# Patient Record
Sex: MALE | Race: WHITE | NOT HISPANIC OR LATINO | Employment: OTHER | URBAN - METROPOLITAN AREA
[De-identification: names, ages, dates, MRNs, and addresses within clinical notes are randomized per-mention and may not be internally consistent; named-entity substitution may affect disease eponyms.]

---

## 2021-01-01 ENCOUNTER — HOSPITAL ENCOUNTER (EMERGENCY)
Facility: CLINIC | Age: 86
Discharge: HOME | End: 2021-10-09
Admitting: PHYSICIAN ASSISTANT
Payer: COMMERCIAL

## 2021-01-01 VITALS
RESPIRATION RATE: 18 BRPM | SYSTOLIC BLOOD PRESSURE: 140 MMHG | DIASTOLIC BLOOD PRESSURE: 76 MMHG | HEART RATE: 56 BPM | OXYGEN SATURATION: 96 %

## 2021-01-01 DIAGNOSIS — R04.0 EPISTAXIS: ICD-10-CM

## 2021-01-01 PROCEDURE — 99282 EMERGENCY DEPT VISIT SF MDM: CPT

## 2021-01-01 ASSESSMENT — ENCOUNTER SYMPTOMS
DYSURIA: 0
EYE DISCHARGE: 0
CHILLS: 0
COUGH: 0
NAUSEA: 0
WEAKNESS: 0
ABDOMINAL PAIN: 0
HEADACHES: 0
FEVER: 0
CHEST TIGHTNESS: 0
WOUND: 0
HEMATURIA: 0
TROUBLE SWALLOWING: 0
FREQUENCY: 0
NECK PAIN: 0
VOMITING: 0
DIARRHEA: 0
BACK PAIN: 0
NUMBNESS: 0
SORE THROAT: 0
SHORTNESS OF BREATH: 0

## 2021-10-10 NOTE — ED TRIAGE NOTES
Pt's daughter reports nose bleed from 17:15-18:15 then it stopped and restarted at 19:15.  Left nares actively bleeding and clamp on.  Only blood thinner is baby aspirin.  Pt denies fall/traumatic injury.

## 2021-10-10 NOTE — DISCHARGE INSTRUCTIONS
Please use the Elvis-Synephrine in the affected nostrils if there is repeat bleeding and use the nasal clamp to hold pressure.  If you have persistent symptoms that are not controlled by this method return to the ED for repeat assessment.

## 2021-10-10 NOTE — ED PROVIDER NOTES
EMERGENCY DEPARTMENT ENCOUNTER      NAME: Nic Cleary  AGE: 89 year old male  YOB: 1930  MRN: [id number]  EVALUATION DATE & TIME: 10/9/2021  7:51 PM    PCP: No primary care provider on file.    ED PROVIDER: Wilbert Hudson PA-C      Chief Complaint   Patient presents with     Epistaxis         FINAL IMPRESSION:  1. Epistaxis          MEDICAL DECISION MAKING:    Pertinent Labs & Imaging studies reviewed. (See chart for details)  89 year old male presents to the Emergency Department for evaluation of epistaxis.    After obtaining history present illness, reviewing vitals and examining the patient we did initiate medical treatment with Clara-Synephrine to the left nostril and clamping.  After 15 to 20 minutes of observation I remove the clamp.  There was no single discrete lesion of bleeding therefore I did not use silver nitrate.  There is no blood in the posterior pharynx.  The Claar-Synephrine and clamp has stopped the bleeding.  At this point time patient will be discharged home and can follow-up as an outpatient to primary care.        ED COURSE    7:53 PM I met with the patient, obtained history, performed an initial exam, and discussed options and plan for diagnostics and treatment here in the ED. PPE worn including N95 mask, surgical gloves, eye protection.  8:24 PM I rechecked and updated the patient and family.    At the conclusion of the encounter I discussed the results of all of the tests and the disposition. The questions were answered. The patient or family acknowledged understanding and was agreeable with the care plan.     MEDICATIONS GIVEN IN THE EMERGENCY:  Medications   phenylephrine (CLARA-SYNEPHRINE) 0.5 % spray 1 drop (has no administration in time range)       NEW PRESCRIPTIONS STARTED AT TODAY'S ER VISIT  New Prescriptions    No medications on file            =================================================================    HPI    Patient information was obtained from: patient's  daughter    Use of Interpretor: N/A        Nic Cleary is a 89 year old male with a pertinent medical history of hypertension, hyperlipidemia, atrial fibrillation (2019) who presents to this ED by walk in with his daughter for evaluation of left nare epistaxis. Patient's daughter reports the patient began to bleed from his left nostril for 1 hour today from 17:15 - 18:15, but she was able to stop it by applying a pressure clamp to his nose. However, the bleeding returned spontaneously at 19:15. Patient used ocean nasal spray without any relief. Patient is on baby Asprin, but denies other blood thinner use. Patient's daughter notes the patient's epistaxis has become more common as she has forgotten to apply Vaseline in his nose in the mornings. Denies any falls or trauma.    No bleeding from the right nostril.      REVIEW OF SYSTEMS   Review of Systems   Constitutional: Negative for chills and fever.   HENT: Positive for nosebleeds (left nare only). Negative for congestion, sore throat and trouble swallowing.    Eyes: Negative for discharge and visual disturbance.   Respiratory: Negative for cough, chest tightness and shortness of breath.    Cardiovascular: Negative for chest pain and leg swelling.   Gastrointestinal: Negative for abdominal pain, diarrhea, nausea and vomiting.   Genitourinary: Negative for dysuria, frequency, hematuria and urgency.   Musculoskeletal: Negative for back pain, gait problem and neck pain.   Skin: Negative for rash and wound.   Neurological: Negative for weakness, numbness and headaches.   Psychiatric/Behavioral: Negative for behavioral problems and suicidal ideas.   All other systems reviewed and are negative.         PAST MEDICAL HISTORY:  No past medical history on file.    PAST SURGICAL HISTORY:  No past surgical history on file.      CURRENT MEDICATIONS:      Current Facility-Administered Medications:      phenylephrine (CLARA-SYNEPHRINE) 0.5 % spray 1 drop, 1 drop, Both Nostrils,  Jay, Wilbert Hudson PA-C  No current outpatient medications on file.      ALLERGIES:  No Known Allergies    FAMILY HISTORY:  No family history on file.    SOCIAL HISTORY:   Social History     Socioeconomic History     Marital status:      Spouse name: Not on file     Number of children: Not on file     Years of education: Not on file     Highest education level: Not on file   Occupational History     Not on file   Tobacco Use     Smoking status: Not on file   Substance and Sexual Activity     Alcohol use: Not on file     Drug use: Not on file     Sexual activity: Not on file   Other Topics Concern     Not on file   Social History Narrative     Not on file     Social Determinants of Health     Financial Resource Strain:      Difficulty of Paying Living Expenses:    Food Insecurity:      Worried About Running Out of Food in the Last Year:      Ran Out of Food in the Last Year:    Transportation Needs:      Lack of Transportation (Medical):      Lack of Transportation (Non-Medical):    Physical Activity:      Days of Exercise per Week:      Minutes of Exercise per Session:    Stress:      Feeling of Stress :    Social Connections:      Frequency of Communication with Friends and Family:      Frequency of Social Gatherings with Friends and Family:      Attends Sikhism Services:      Active Member of Clubs or Organizations:      Attends Club or Organization Meetings:      Marital Status:    Intimate Partner Violence:      Fear of Current or Ex-Partner:      Emotionally Abused:      Physically Abused:      Sexually Abused:        VITALS:  Patient Vitals for the past 24 hrs:   BP Pulse Resp SpO2   10/09/21 1948 (!) 176/98 56 18 96 %       PHYSICAL EXAM    Physical Exam  Vitals and nursing note reviewed.   Constitutional:       General: He is not in acute distress.     Appearance: Normal appearance. He is not ill-appearing or toxic-appearing.   HENT:      Head: Normocephalic and atraumatic.      Right Ear:  External ear normal.      Left Ear: External ear normal.      Nose: Nose normal.      Comments: Acute epistaxis coming from the left nare, minimal at this time.     Mouth/Throat:      Mouth: Mucous membranes are moist.      Pharynx: Oropharynx is clear. No oropharyngeal exudate or posterior oropharyngeal erythema.      Comments: No blood in the posterior pharynx  Eyes:      Extraocular Movements: Extraocular movements intact.      Conjunctiva/sclera: Conjunctivae normal.   Cardiovascular:      Rate and Rhythm: Regular rhythm.   Pulmonary:      Effort: Pulmonary effort is normal. No respiratory distress.   Musculoskeletal:         General: No swelling, tenderness, deformity or signs of injury. Normal range of motion.   Skin:     General: Skin is warm and dry.      Findings: No bruising, erythema or rash.   Neurological:      General: No focal deficit present.      Mental Status: He is alert and oriented to person, place, and time. Mental status is at baseline.      Sensory: No sensory deficit.      Motor: No weakness.      Gait: Gait normal.   Psychiatric:         Mood and Affect: Mood normal.         Behavior: Behavior normal.         Judgment: Judgment normal.          LAB:  All pertinent labs reviewed and interpreted.       RADIOLOGY:  Reviewed all pertinent imaging. Please see official radiology report.  No orders to display     Procedure:    Epistaxis treatment: Elvis-Synephrine 2 sprays were applied to the left nare nasal clamp applied.  This was observed for 15 to 20 minutes.  After this we we removed the clamp and bleeding has stopped.  No further management necessary.      I, Moy Fuentes, am serving as a scribe to document services personally performed by Wilbert Hudson PA-C based on my observation and the provider's statements to me. I, Wilbert Hudson PA-C attest that Moy Fuentes is acting in a scribe capacity, has observed my performance of the services and has documented them in  accordance with my direction.    Wilbert Hudson PA-C  Emergency Medicine  [facility]     Wilbert Hudson PA-C  10/09/21 2039

## 2022-01-01 ENCOUNTER — NURSING HOME VISIT (OUTPATIENT)
Dept: GERIATRICS | Facility: CLINIC | Age: 87
End: 2022-01-01
Payer: MEDICARE

## 2022-01-01 ENCOUNTER — APPOINTMENT (OUTPATIENT)
Dept: RADIOLOGY | Facility: CLINIC | Age: 87
DRG: 535 | End: 2022-01-01
Attending: STUDENT IN AN ORGANIZED HEALTH CARE EDUCATION/TRAINING PROGRAM
Payer: COMMERCIAL

## 2022-01-01 ENCOUNTER — APPOINTMENT (OUTPATIENT)
Dept: CT IMAGING | Facility: CLINIC | Age: 87
DRG: 535 | End: 2022-01-01
Attending: STUDENT IN AN ORGANIZED HEALTH CARE EDUCATION/TRAINING PROGRAM
Payer: COMMERCIAL

## 2022-01-01 ENCOUNTER — LAB REQUISITION (OUTPATIENT)
Dept: LAB | Facility: CLINIC | Age: 87
End: 2022-01-01
Payer: COMMERCIAL

## 2022-01-01 ENCOUNTER — HOSPITAL ENCOUNTER (INPATIENT)
Facility: CLINIC | Age: 87
LOS: 4 days | Discharge: HOSPICE | DRG: 535 | End: 2022-07-18
Attending: STUDENT IN AN ORGANIZED HEALTH CARE EDUCATION/TRAINING PROGRAM | Admitting: INTERNAL MEDICINE
Payer: COMMERCIAL

## 2022-01-01 ENCOUNTER — PATIENT OUTREACH (OUTPATIENT)
Dept: CARE COORDINATION | Facility: CLINIC | Age: 87
End: 2022-01-01

## 2022-01-01 ENCOUNTER — MEDICAL CORRESPONDENCE (OUTPATIENT)
Dept: HEALTH INFORMATION MANAGEMENT | Facility: CLINIC | Age: 87
End: 2022-01-01

## 2022-01-01 VITALS
HEART RATE: 83 BPM | DIASTOLIC BLOOD PRESSURE: 77 MMHG | TEMPERATURE: 96.8 F | WEIGHT: 167 LBS | BODY MASS INDEX: 23.91 KG/M2 | SYSTOLIC BLOOD PRESSURE: 157 MMHG | RESPIRATION RATE: 18 BRPM | OXYGEN SATURATION: 95 % | HEIGHT: 70 IN

## 2022-01-01 VITALS
DIASTOLIC BLOOD PRESSURE: 77 MMHG | OXYGEN SATURATION: 90 % | RESPIRATION RATE: 16 BRPM | HEART RATE: 72 BPM | TEMPERATURE: 97.2 F | BODY MASS INDEX: 24.02 KG/M2 | WEIGHT: 167.8 LBS | SYSTOLIC BLOOD PRESSURE: 163 MMHG | HEIGHT: 70 IN

## 2022-01-01 VITALS
HEIGHT: 70 IN | SYSTOLIC BLOOD PRESSURE: 110 MMHG | WEIGHT: 167 LBS | HEART RATE: 84 BPM | DIASTOLIC BLOOD PRESSURE: 66 MMHG | BODY MASS INDEX: 23.91 KG/M2 | TEMPERATURE: 97 F | RESPIRATION RATE: 24 BRPM | OXYGEN SATURATION: 93 %

## 2022-01-01 VITALS
WEIGHT: 185 LBS | OXYGEN SATURATION: 93 % | TEMPERATURE: 97.6 F | BODY MASS INDEX: 26.54 KG/M2 | HEART RATE: 83 BPM | SYSTOLIC BLOOD PRESSURE: 157 MMHG | RESPIRATION RATE: 18 BRPM | DIASTOLIC BLOOD PRESSURE: 77 MMHG

## 2022-01-01 DIAGNOSIS — I48.0 PAROXYSMAL ATRIAL FIBRILLATION (H): ICD-10-CM

## 2022-01-01 DIAGNOSIS — F02.80 LATE ONSET ALZHEIMER'S DEMENTIA WITHOUT BEHAVIORAL DISTURBANCE (H): ICD-10-CM

## 2022-01-01 DIAGNOSIS — Z51.5 HOSPICE CARE PATIENT: ICD-10-CM

## 2022-01-01 DIAGNOSIS — G30.1 LATE ONSET ALZHEIMER'S DEMENTIA WITHOUT BEHAVIORAL DISTURBANCE (H): Primary | ICD-10-CM

## 2022-01-01 DIAGNOSIS — Z51.5 END OF LIFE CARE: ICD-10-CM
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
## 2022-01-01 DIAGNOSIS — G30.1 LATE ONSET ALZHEIMER'S DEMENTIA WITHOUT BEHAVIORAL DISTURBANCE (H): ICD-10-CM

## 2022-01-01 DIAGNOSIS — F02.80 LATE ONSET ALZHEIMER'S DEMENTIA WITHOUT BEHAVIORAL DISTURBANCE (H): Primary | ICD-10-CM

## 2022-01-01 DIAGNOSIS — S32.9XXA: Primary | ICD-10-CM

## 2022-01-01 DIAGNOSIS — M79.662 PAIN OF LEFT LOWER LEG: ICD-10-CM

## 2022-01-01 DIAGNOSIS — U07.1 COVID-19: ICD-10-CM

## 2022-01-01 DIAGNOSIS — S32.9XXA: ICD-10-CM

## 2022-01-01 DIAGNOSIS — K59.09 OTHER CONSTIPATION: ICD-10-CM

## 2022-01-01 DIAGNOSIS — M79.622 PAIN OF LEFT UPPER ARM: ICD-10-CM

## 2022-01-01 DIAGNOSIS — I25.5 ISCHEMIC CARDIOMYOPATHY: ICD-10-CM

## 2022-01-01 DIAGNOSIS — S06.5X0D SUBDURAL HEMATOMA WITHOUT COMA, WITHOUT LOSS OF CONSCIOUSNESS, SUBSEQUENT ENCOUNTER: ICD-10-CM

## 2022-01-01 DIAGNOSIS — I73.9 PERIPHERAL VASCULAR DISEASE (H): Primary | ICD-10-CM

## 2022-01-01 DIAGNOSIS — S06.5X1D: ICD-10-CM

## 2022-01-01 DIAGNOSIS — S06.5X9S: ICD-10-CM

## 2022-01-01 DIAGNOSIS — F03.90 DEMENTIA WITHOUT BEHAVIORAL DISTURBANCE, UNSPECIFIED DEMENTIA TYPE: ICD-10-CM

## 2022-01-01 DIAGNOSIS — Z71.89 OTHER SPECIFIED COUNSELING: ICD-10-CM

## 2022-01-01 LAB
ALBUMIN SERPL-MCNC: 3.6 G/DL (ref 3.5–5)
ALBUMIN SERPL-MCNC: 3.9 G/DL (ref 3.5–5)
ALBUMIN UR-MCNC: 10 MG/DL
ALP SERPL-CCNC: 43 U/L (ref 45–120)
ALP SERPL-CCNC: 57 U/L (ref 45–120)
ALT SERPL W P-5'-P-CCNC: 10 U/L (ref 0–45)
ALT SERPL W P-5'-P-CCNC: <9 U/L (ref 0–45)
ANION GAP SERPL CALCULATED.3IONS-SCNC: 6 MMOL/L (ref 5–18)
ANION GAP SERPL CALCULATED.3IONS-SCNC: 8 MMOL/L (ref 5–18)
APPEARANCE UR: CLEAR
AST SERPL W P-5'-P-CCNC: 16 U/L (ref 0–40)
AST SERPL W P-5'-P-CCNC: 16 U/L (ref 0–40)
ATRIAL RATE - MUSE: 416 BPM
BASOPHILS # BLD AUTO: 0 10E3/UL (ref 0–0.2)
BASOPHILS # BLD AUTO: 0 10E3/UL (ref 0–0.2)
BASOPHILS NFR BLD AUTO: 0 %
BASOPHILS NFR BLD AUTO: 1 %
BILIRUB SERPL-MCNC: 0.8 MG/DL (ref 0–1)
BILIRUB SERPL-MCNC: 1.4 MG/DL (ref 0–1)
BILIRUB UR QL STRIP: NEGATIVE
BUN SERPL-MCNC: 17 MG/DL (ref 8–28)
BUN SERPL-MCNC: 17 MG/DL (ref 8–28)
CALCIUM SERPL-MCNC: 8.9 MG/DL (ref 8.5–10.5)
CALCIUM SERPL-MCNC: 9.3 MG/DL (ref 8.5–10.5)
CHLORIDE BLD-SCNC: 103 MMOL/L (ref 98–107)
CHLORIDE BLD-SCNC: 104 MMOL/L (ref 98–107)
CK SERPL-CCNC: 117 U/L (ref 30–190)
CO2 SERPL-SCNC: 26 MMOL/L (ref 22–31)
CO2 SERPL-SCNC: 28 MMOL/L (ref 22–31)
COLOR UR AUTO: YELLOW
CREAT SERPL-MCNC: 0.81 MG/DL (ref 0.7–1.3)
CREAT SERPL-MCNC: 0.89 MG/DL (ref 0.7–1.3)
DIASTOLIC BLOOD PRESSURE - MUSE: 93 MMHG
EOSINOPHIL # BLD AUTO: 0.2 10E3/UL (ref 0–0.7)
EOSINOPHIL # BLD AUTO: 0.4 10E3/UL (ref 0–0.7)
EOSINOPHIL NFR BLD AUTO: 2 %
EOSINOPHIL NFR BLD AUTO: 4 %
ERYTHROCYTE [DISTWIDTH] IN BLOOD BY AUTOMATED COUNT: 13.1 % (ref 10–15)
ERYTHROCYTE [DISTWIDTH] IN BLOOD BY AUTOMATED COUNT: 13.2 % (ref 10–15)
GFR SERPL CREATININE-BSD FRML MDRD: 81 ML/MIN/1.73M2
GFR SERPL CREATININE-BSD FRML MDRD: 83 ML/MIN/1.73M2
GLUCOSE BLD-MCNC: 114 MG/DL (ref 70–125)
GLUCOSE BLD-MCNC: 116 MG/DL (ref 70–125)
GLUCOSE UR STRIP-MCNC: NEGATIVE MG/DL
HCT VFR BLD AUTO: 31.9 % (ref 40–53)
HCT VFR BLD AUTO: 35.5 % (ref 40–53)
HGB BLD-MCNC: 10.6 G/DL (ref 13.3–17.7)
HGB BLD-MCNC: 11.7 G/DL (ref 13.3–17.7)
HGB UR QL STRIP: ABNORMAL
IMM GRANULOCYTES # BLD: 0 10E3/UL
IMM GRANULOCYTES # BLD: 0.1 10E3/UL
IMM GRANULOCYTES NFR BLD: 0 %
IMM GRANULOCYTES NFR BLD: 1 %
INTERPRETATION ECG - MUSE: NORMAL
KETONES UR STRIP-MCNC: NEGATIVE MG/DL
LEUKOCYTE ESTERASE UR QL STRIP: NEGATIVE
LYMPHOCYTES # BLD AUTO: 1.4 10E3/UL (ref 0.8–5.3)
LYMPHOCYTES # BLD AUTO: 1.5 10E3/UL (ref 0.8–5.3)
LYMPHOCYTES NFR BLD AUTO: 12 %
LYMPHOCYTES NFR BLD AUTO: 18 %
MAGNESIUM SERPL-MCNC: 1.9 MG/DL (ref 1.8–2.6)
MCH RBC QN AUTO: 30.2 PG (ref 26.5–33)
MCH RBC QN AUTO: 30.3 PG (ref 26.5–33)
MCHC RBC AUTO-ENTMCNC: 33 G/DL (ref 31.5–36.5)
MCHC RBC AUTO-ENTMCNC: 33.2 G/DL (ref 31.5–36.5)
MCV RBC AUTO: 91 FL (ref 78–100)
MCV RBC AUTO: 92 FL (ref 78–100)
MONOCYTES # BLD AUTO: 0.7 10E3/UL (ref 0–1.3)
MONOCYTES # BLD AUTO: 0.8 10E3/UL (ref 0–1.3)
MONOCYTES NFR BLD AUTO: 7 %
MONOCYTES NFR BLD AUTO: 9 %
MUCOUS THREADS #/AREA URNS LPF: PRESENT /LPF
NEUTROPHILS # BLD AUTO: 5.5 10E3/UL (ref 1.6–8.3)
NEUTROPHILS # BLD AUTO: 8.7 10E3/UL (ref 1.6–8.3)
NEUTROPHILS NFR BLD AUTO: 68 %
NEUTROPHILS NFR BLD AUTO: 78 %
NITRATE UR QL: NEGATIVE
NRBC # BLD AUTO: 0 10E3/UL
NRBC # BLD AUTO: 0 10E3/UL
NRBC BLD AUTO-RTO: 0 /100
NRBC BLD AUTO-RTO: 0 /100
P AXIS - MUSE: NORMAL DEGREES
PH UR STRIP: 5.5 [PH] (ref 5–7)
PLATELET # BLD AUTO: 141 10E3/UL (ref 150–450)
PLATELET # BLD AUTO: 154 10E3/UL (ref 150–450)
POTASSIUM BLD-SCNC: 4 MMOL/L (ref 3.5–5)
POTASSIUM BLD-SCNC: 4.1 MMOL/L (ref 3.5–5)
PR INTERVAL - MUSE: NORMAL MS
PROT SERPL-MCNC: 6.2 G/DL (ref 6–8)
PROT SERPL-MCNC: 6.6 G/DL (ref 6–8)
QRS DURATION - MUSE: 98 MS
QT - MUSE: 384 MS
QTC - MUSE: 456 MS
R AXIS - MUSE: -39 DEGREES
RBC # BLD AUTO: 3.5 10E6/UL (ref 4.4–5.9)
RBC # BLD AUTO: 3.88 10E6/UL (ref 4.4–5.9)
RBC URINE: 23 /HPF
SARS-COV-2 RNA RESP QL NAA+PROBE: NEGATIVE
SARS-COV-2 RNA RESP QL NAA+PROBE: NEGATIVE
SODIUM SERPL-SCNC: 137 MMOL/L (ref 136–145)
SODIUM SERPL-SCNC: 138 MMOL/L (ref 136–145)
SP GR UR STRIP: 1.02 (ref 1–1.03)
SQUAMOUS EPITHELIAL: <1 /HPF
SYSTOLIC BLOOD PRESSURE - MUSE: 179 MMHG
T AXIS - MUSE: 74 DEGREES
TROPONIN I SERPL-MCNC: 0.02 NG/ML (ref 0–0.29)
UROBILINOGEN UR STRIP-MCNC: <2 MG/DL
VENTRICULAR RATE- MUSE: 85 BPM
WBC # BLD AUTO: 11.1 10E3/UL (ref 4–11)
WBC # BLD AUTO: 8.1 10E3/UL (ref 4–11)
WBC URINE: 4 /HPF

## 2022-01-01 PROCEDURE — 99305 1ST NF CARE MODERATE MDM 35: CPT | Mod: GV | Performed by: FAMILY MEDICINE

## 2022-01-01 PROCEDURE — 250N000013 HC RX MED GY IP 250 OP 250 PS 637: Performed by: INTERNAL MEDICINE

## 2022-01-01 PROCEDURE — 85025 COMPLETE CBC W/AUTO DIFF WBC: CPT | Performed by: STUDENT IN AN ORGANIZED HEALTH CARE EDUCATION/TRAINING PROGRAM

## 2022-01-01 PROCEDURE — 73610 X-RAY EXAM OF ANKLE: CPT | Mod: RT

## 2022-01-01 PROCEDURE — 70450 CT HEAD/BRAIN W/O DYE: CPT

## 2022-01-01 PROCEDURE — XW023W7 INTRODUCTION OF COVID-19 VACCINE BOOSTER INTO MUSCLE, PERCUTANEOUS APPROACH, NEW TECHNOLOGY GROUP 7: ICD-10-PCS | Performed by: FAMILY MEDICINE

## 2022-01-01 PROCEDURE — 250N000013 HC RX MED GY IP 250 OP 250 PS 637: Performed by: NURSE PRACTITIONER

## 2022-01-01 PROCEDURE — C9803 HOPD COVID-19 SPEC COLLECT: HCPCS

## 2022-01-01 PROCEDURE — 73060 X-RAY EXAM OF HUMERUS: CPT | Mod: LT

## 2022-01-01 PROCEDURE — 36415 COLL VENOUS BLD VENIPUNCTURE: CPT | Performed by: STUDENT IN AN ORGANIZED HEALTH CARE EDUCATION/TRAINING PROGRAM

## 2022-01-01 PROCEDURE — 74176 CT ABD & PELVIS W/O CONTRAST: CPT

## 2022-01-01 PROCEDURE — G0378 HOSPITAL OBSERVATION PER HR: HCPCS

## 2022-01-01 PROCEDURE — 36415 COLL VENOUS BLD VENIPUNCTURE: CPT | Performed by: INTERNAL MEDICINE

## 2022-01-01 PROCEDURE — 72170 X-RAY EXAM OF PELVIS: CPT

## 2022-01-01 PROCEDURE — 80053 COMPREHEN METABOLIC PANEL: CPT | Performed by: INTERNAL MEDICINE

## 2022-01-01 PROCEDURE — 99309 SBSQ NF CARE MODERATE MDM 30: CPT | Mod: GV | Performed by: NURSE PRACTITIONER

## 2022-01-01 PROCEDURE — 99232 SBSQ HOSP IP/OBS MODERATE 35: CPT | Performed by: INTERNAL MEDICINE

## 2022-01-01 PROCEDURE — U0003 INFECTIOUS AGENT DETECTION BY NUCLEIC ACID (DNA OR RNA); SEVERE ACUTE RESPIRATORY SYNDROME CORONAVIRUS 2 (SARS-COV-2) (CORONAVIRUS DISEASE [COVID-19]), AMPLIFIED PROBE TECHNIQUE, MAKING USE OF HIGH THROUGHPUT TECHNOLOGIES AS DESCRIBED BY CMS-2020-01-R: HCPCS | Performed by: STUDENT IN AN ORGANIZED HEALTH CARE EDUCATION/TRAINING PROGRAM

## 2022-01-01 PROCEDURE — 99310 SBSQ NF CARE HIGH MDM 45: CPT | Mod: GV | Performed by: NURSE PRACTITIONER

## 2022-01-01 PROCEDURE — 73552 X-RAY EXAM OF FEMUR 2/>: CPT | Mod: LT

## 2022-01-01 PROCEDURE — U0003 INFECTIOUS AGENT DETECTION BY NUCLEIC ACID (DNA OR RNA); SEVERE ACUTE RESPIRATORY SYNDROME CORONAVIRUS 2 (SARS-COV-2) (CORONAVIRUS DISEASE [COVID-19]), AMPLIFIED PROBE TECHNIQUE, MAKING USE OF HIGH THROUGHPUT TECHNOLOGIES AS DESCRIBED BY CMS-2020-01-R: HCPCS | Mod: ORL | Performed by: FAMILY MEDICINE

## 2022-01-01 PROCEDURE — 250N000011 HC RX IP 250 OP 636: Performed by: FAMILY MEDICINE

## 2022-01-01 PROCEDURE — 120N000001 HC R&B MED SURG/OB

## 2022-01-01 PROCEDURE — 84484 ASSAY OF TROPONIN QUANT: CPT | Performed by: STUDENT IN AN ORGANIZED HEALTH CARE EDUCATION/TRAINING PROGRAM

## 2022-01-01 PROCEDURE — 72125 CT NECK SPINE W/O DYE: CPT

## 2022-01-01 PROCEDURE — 99223 1ST HOSP IP/OBS HIGH 75: CPT | Performed by: NURSE PRACTITIONER

## 2022-01-01 PROCEDURE — 81001 URINALYSIS AUTO W/SCOPE: CPT | Performed by: INTERNAL MEDICINE

## 2022-01-01 PROCEDURE — 93005 ELECTROCARDIOGRAM TRACING: CPT | Performed by: EMERGENCY MEDICINE

## 2022-01-01 PROCEDURE — 0054A HC ADMIN COVID VAC PFIZER TRS-SUCR, BOOSTER: CPT | Performed by: FAMILY MEDICINE

## 2022-01-01 PROCEDURE — 250N000011 HC RX IP 250 OP 636: Performed by: INTERNAL MEDICINE

## 2022-01-01 PROCEDURE — 99220 PR INITIAL OBSERVATION CARE,LEVEL III: CPT | Performed by: INTERNAL MEDICINE

## 2022-01-01 PROCEDURE — 250N000013 HC RX MED GY IP 250 OP 250 PS 637: Performed by: STUDENT IN AN ORGANIZED HEALTH CARE EDUCATION/TRAINING PROGRAM

## 2022-01-01 PROCEDURE — 83735 ASSAY OF MAGNESIUM: CPT | Performed by: STUDENT IN AN ORGANIZED HEALTH CARE EDUCATION/TRAINING PROGRAM

## 2022-01-01 PROCEDURE — 99285 EMERGENCY DEPT VISIT HI MDM: CPT | Mod: 25

## 2022-01-01 PROCEDURE — 250N000013 HC RX MED GY IP 250 OP 250 PS 637: Performed by: FAMILY MEDICINE

## 2022-01-01 PROCEDURE — 85025 COMPLETE CBC W/AUTO DIFF WBC: CPT | Performed by: INTERNAL MEDICINE

## 2022-01-01 PROCEDURE — 99239 HOSP IP/OBS DSCHRG MGMT >30: CPT | Performed by: FAMILY MEDICINE

## 2022-01-01 PROCEDURE — 82550 ASSAY OF CK (CPK): CPT | Performed by: STUDENT IN AN ORGANIZED HEALTH CARE EDUCATION/TRAINING PROGRAM

## 2022-01-01 PROCEDURE — 91305 HC RX IP 250 OP 636: CPT | Performed by: FAMILY MEDICINE

## 2022-01-01 PROCEDURE — 80053 COMPREHEN METABOLIC PANEL: CPT | Performed by: STUDENT IN AN ORGANIZED HEALTH CARE EDUCATION/TRAINING PROGRAM

## 2022-01-01 RX ORDER — QUETIAPINE FUMARATE 25 MG/1
25-50 TABLET, FILM COATED ORAL AT BEDTIME
Status: ON HOLD | COMMUNITY
Start: 2022-01-01 | End: 2022-01-01

## 2022-01-01 RX ORDER — LISINOPRIL 10 MG/1
10 TABLET ORAL AT BEDTIME
Status: DISCONTINUED | OUTPATIENT
Start: 2022-01-01 | End: 2022-01-01 | Stop reason: HOSPADM

## 2022-01-01 RX ORDER — NALOXONE HYDROCHLORIDE 0.4 MG/ML
0.2 INJECTION, SOLUTION INTRAMUSCULAR; INTRAVENOUS; SUBCUTANEOUS
Status: DISCONTINUED | OUTPATIENT
Start: 2022-01-01 | End: 2022-01-01 | Stop reason: DRUGHIGH

## 2022-01-01 RX ORDER — ASPIRIN 81 MG/1
81 TABLET ORAL DAILY
COMMUNITY
End: 2022-01-01

## 2022-01-01 RX ORDER — ONDANSETRON 2 MG/ML
4 INJECTION INTRAMUSCULAR; INTRAVENOUS EVERY 6 HOURS PRN
Status: DISCONTINUED | OUTPATIENT
Start: 2022-01-01 | End: 2022-01-01 | Stop reason: HOSPADM

## 2022-01-01 RX ORDER — OXYCODONE HYDROCHLORIDE 5 MG/1
2.5 TABLET ORAL EVERY 12 HOURS
Qty: 6 TABLET | Refills: 0 | Status: SHIPPED | DISCHARGE
Start: 2022-01-01 | End: 2022-01-01

## 2022-01-01 RX ORDER — NALOXONE HYDROCHLORIDE 0.4 MG/ML
0.4 INJECTION, SOLUTION INTRAMUSCULAR; INTRAVENOUS; SUBCUTANEOUS
Status: DISCONTINUED | OUTPATIENT
Start: 2022-01-01 | End: 2022-01-01 | Stop reason: DRUGHIGH

## 2022-01-01 RX ORDER — CARBOXYMETHYLCELLULOSE SODIUM 5 MG/ML
1-2 SOLUTION/ DROPS OPHTHALMIC
Status: DISCONTINUED | OUTPATIENT
Start: 2022-01-01 | End: 2022-01-01 | Stop reason: HOSPADM

## 2022-01-01 RX ORDER — ACETAMINOPHEN 325 MG/1
650 TABLET ORAL EVERY 6 HOURS PRN
Status: DISCONTINUED | OUTPATIENT
Start: 2022-01-01 | End: 2022-01-01 | Stop reason: HOSPADM

## 2022-01-01 RX ORDER — DIPHENHYDRAMINE HYDROCHLORIDE 50 MG/ML
50 INJECTION INTRAMUSCULAR; INTRAVENOUS
Status: DISCONTINUED | OUTPATIENT
Start: 2022-01-01 | End: 2022-01-01 | Stop reason: HOSPADM

## 2022-01-01 RX ORDER — PROCHLORPERAZINE MALEATE 10 MG
10 TABLET ORAL EVERY 6 HOURS PRN
COMMUNITY

## 2022-01-01 RX ORDER — LATANOPROST 50 UG/ML
1 SOLUTION/ DROPS OPHTHALMIC EVERY MORNING
Status: DISCONTINUED | OUTPATIENT
Start: 2022-01-01 | End: 2022-01-01 | Stop reason: HOSPADM

## 2022-01-01 RX ORDER — QUETIAPINE FUMARATE 25 MG/1
25 TABLET, FILM COATED ORAL EVERY 6 HOURS PRN
Status: DISCONTINUED | OUTPATIENT
Start: 2022-01-01 | End: 2022-01-01 | Stop reason: HOSPADM

## 2022-01-01 RX ORDER — ONDANSETRON 4 MG/1
4 TABLET, ORALLY DISINTEGRATING ORAL EVERY 6 HOURS PRN
Status: DISCONTINUED | OUTPATIENT
Start: 2022-01-01 | End: 2022-01-01 | Stop reason: HOSPADM

## 2022-01-01 RX ORDER — MORPHINE SULFATE 30 MG/1
2.5 TABLET ORAL
COMMUNITY

## 2022-01-01 RX ORDER — BISACODYL 10 MG
10 SUPPOSITORY, RECTAL RECTAL
Status: DISCONTINUED | OUTPATIENT
Start: 2022-01-01 | End: 2022-01-01 | Stop reason: HOSPADM

## 2022-01-01 RX ORDER — AMOXICILLIN 250 MG
1 CAPSULE ORAL AT BEDTIME
Status: DISCONTINUED | OUTPATIENT
Start: 2022-01-01 | End: 2022-01-01

## 2022-01-01 RX ORDER — FUROSEMIDE 20 MG/1
10 TABLET ORAL EVERY OTHER DAY
Status: DISCONTINUED | OUTPATIENT
Start: 2022-01-01 | End: 2022-01-01

## 2022-01-01 RX ORDER — ASPIRIN 81 MG/1
81 TABLET ORAL DAILY
Status: DISCONTINUED | OUTPATIENT
Start: 2022-01-01 | End: 2022-01-01 | Stop reason: HOSPADM

## 2022-01-01 RX ORDER — BISACODYL 10 MG
10 SUPPOSITORY, RECTAL RECTAL DAILY PRN
COMMUNITY

## 2022-01-01 RX ORDER — NALOXONE HYDROCHLORIDE 0.4 MG/ML
0.2 INJECTION, SOLUTION INTRAMUSCULAR; INTRAVENOUS; SUBCUTANEOUS
Status: DISCONTINUED | OUTPATIENT
Start: 2022-01-01 | End: 2022-01-01 | Stop reason: HOSPADM

## 2022-01-01 RX ORDER — SERTRALINE HYDROCHLORIDE 25 MG/1
37.5 TABLET, FILM COATED ORAL EVERY MORNING
COMMUNITY
Start: 2022-01-01 | End: 2022-01-01

## 2022-01-01 RX ORDER — QUETIAPINE FUMARATE 25 MG/1
25 TABLET, FILM COATED ORAL EVERY 6 HOURS PRN
Qty: 10 TABLET | Refills: 0 | DISCHARGE
Start: 2022-01-01 | End: 2022-01-01

## 2022-01-01 RX ORDER — LATANOPROST 50 UG/ML
1 SOLUTION/ DROPS OPHTHALMIC EVERY MORNING
COMMUNITY
Start: 2022-01-01 | End: 2022-01-01

## 2022-01-01 RX ORDER — ACETAMINOPHEN 500 MG
1000 TABLET ORAL 2 TIMES DAILY
Status: DISCONTINUED | OUTPATIENT
Start: 2022-01-01 | End: 2022-01-01 | Stop reason: HOSPADM

## 2022-01-01 RX ORDER — ACETAMINOPHEN 325 MG/1
650 TABLET ORAL EVERY 6 HOURS PRN
DISCHARGE
Start: 2022-01-01 | End: 2022-01-01

## 2022-01-01 RX ORDER — HYDROCODONE BITARTRATE AND ACETAMINOPHEN 5; 325 MG/1; MG/1
1 TABLET ORAL EVERY 6 HOURS PRN
Status: DISCONTINUED | OUTPATIENT
Start: 2022-01-01 | End: 2022-01-01 | Stop reason: HOSPADM

## 2022-01-01 RX ORDER — HYDROCODONE BITARTRATE AND ACETAMINOPHEN 5; 325 MG/1; MG/1
1 TABLET ORAL EVERY 6 HOURS PRN
Qty: 15 TABLET | Refills: 0 | Status: SHIPPED | DISCHARGE
Start: 2022-01-01 | End: 2022-01-01

## 2022-01-01 RX ORDER — LEVOTHYROXINE SODIUM 75 UG/1
75 TABLET ORAL
COMMUNITY
Start: 2022-01-01 | End: 2022-01-01

## 2022-01-01 RX ORDER — HALOPERIDOL 2 MG/ML
1 SOLUTION ORAL
Status: DISCONTINUED | OUTPATIENT
Start: 2022-01-01 | End: 2022-01-01

## 2022-01-01 RX ORDER — QUETIAPINE FUMARATE 50 MG/1
50 TABLET, FILM COATED ORAL AT BEDTIME
Status: DISCONTINUED | OUTPATIENT
Start: 2022-01-01 | End: 2022-01-01

## 2022-01-01 RX ORDER — LEVOTHYROXINE SODIUM 75 UG/1
75 TABLET ORAL
Status: DISCONTINUED | OUTPATIENT
Start: 2022-01-01 | End: 2022-01-01 | Stop reason: HOSPADM

## 2022-01-01 RX ORDER — AMOXICILLIN 250 MG
1 CAPSULE ORAL EVERY MORNING
Status: ON HOLD | COMMUNITY
End: 2022-01-01

## 2022-01-01 RX ORDER — AMOXICILLIN 250 MG
2 CAPSULE ORAL AT BEDTIME
Status: ON HOLD | COMMUNITY
End: 2022-01-01

## 2022-01-01 RX ORDER — HYDROMORPHONE HCL IN WATER/PF 6 MG/30 ML
0.2 PATIENT CONTROLLED ANALGESIA SYRINGE INTRAVENOUS EVERY 4 HOURS PRN
Status: DISCONTINUED | OUTPATIENT
Start: 2022-01-01 | End: 2022-01-01 | Stop reason: HOSPADM

## 2022-01-01 RX ORDER — AMOXICILLIN 250 MG
1 CAPSULE ORAL 2 TIMES DAILY
DISCHARGE
Start: 2022-01-01 | End: 2022-01-01

## 2022-01-01 RX ORDER — QUETIAPINE FUMARATE 25 MG/1
25-50 TABLET, FILM COATED ORAL AT BEDTIME
Status: DISCONTINUED | OUTPATIENT
Start: 2022-01-01 | End: 2022-01-01 | Stop reason: HOSPADM

## 2022-01-01 RX ORDER — NALOXONE HYDROCHLORIDE 0.4 MG/ML
0.1 INJECTION, SOLUTION INTRAMUSCULAR; INTRAVENOUS; SUBCUTANEOUS
Status: DISCONTINUED | OUTPATIENT
Start: 2022-01-01 | End: 2022-01-01 | Stop reason: HOSPADM

## 2022-01-01 RX ORDER — ACETAMINOPHEN 500 MG
1000 TABLET ORAL 2 TIMES DAILY
COMMUNITY
End: 2022-01-01

## 2022-01-01 RX ORDER — ACETAMINOPHEN 650 MG/1
650 SUPPOSITORY RECTAL EVERY 6 HOURS PRN
Status: DISCONTINUED | OUTPATIENT
Start: 2022-01-01 | End: 2022-01-01 | Stop reason: HOSPADM

## 2022-01-01 RX ORDER — FUROSEMIDE 20 MG
10 TABLET ORAL EVERY OTHER DAY
Status: ON HOLD | COMMUNITY
Start: 2022-01-01 | End: 2022-01-01

## 2022-01-01 RX ORDER — DIPHENHYDRAMINE HCL 50 MG
50 CAPSULE ORAL
Status: DISCONTINUED | OUTPATIENT
Start: 2022-01-01 | End: 2022-01-01 | Stop reason: HOSPADM

## 2022-01-01 RX ORDER — LISINOPRIL 10 MG/1
10 TABLET ORAL AT BEDTIME
COMMUNITY
Start: 2022-01-01 | End: 2022-01-01

## 2022-01-01 RX ORDER — HYOSCYAMINE SULFATE 0.125 MG
0.12 TABLET ORAL EVERY 4 HOURS PRN
COMMUNITY

## 2022-01-01 RX ORDER — OXYCODONE HYDROCHLORIDE 5 MG/1
2.5 TABLET ORAL EVERY 12 HOURS
Refills: 0 | Status: SHIPPED | DISCHARGE
Start: 2022-01-01 | End: 2022-01-01

## 2022-01-01 RX ORDER — QUETIAPINE FUMARATE 25 MG/1
50 TABLET, FILM COATED ORAL AT BEDTIME
Qty: 7 TABLET | Refills: 0 | DISCHARGE
Start: 2022-01-01 | End: 2022-01-01

## 2022-01-01 RX ORDER — ATROPINE SULFATE 10 MG/ML
2 SOLUTION/ DROPS OPHTHALMIC EVERY 4 HOURS PRN
Status: DISCONTINUED | OUTPATIENT
Start: 2022-01-01 | End: 2022-01-01 | Stop reason: HOSPADM

## 2022-01-01 RX ORDER — AMOXICILLIN 250 MG
1 CAPSULE ORAL 2 TIMES DAILY
Status: DISCONTINUED | OUTPATIENT
Start: 2022-01-01 | End: 2022-01-01 | Stop reason: HOSPADM

## 2022-01-01 RX ADMIN — GLYCERIN 1 SUPPOSITORY: 2 SUPPOSITORY RECTAL at 06:55

## 2022-01-01 RX ADMIN — QUETIAPINE FUMARATE 50 MG: 25 TABLET ORAL at 19:31

## 2022-01-01 RX ADMIN — OXYCODONE HYDROCHLORIDE 2.5 MG: 5 TABLET ORAL at 16:37

## 2022-01-01 RX ADMIN — SENNOSIDES AND DOCUSATE SODIUM 1 TABLET: 50; 8.6 TABLET ORAL at 08:06

## 2022-01-01 RX ADMIN — SERTRALINE 37.5 MG: 25 TABLET, FILM COATED ORAL at 07:48

## 2022-01-01 RX ADMIN — ACETAMINOPHEN 1000 MG: 500 TABLET ORAL at 08:06

## 2022-01-01 RX ADMIN — OXYCODONE HYDROCHLORIDE 2.5 MG: 5 TABLET ORAL at 04:15

## 2022-01-01 RX ADMIN — OXYCODONE HYDROCHLORIDE 2.5 MG: 5 TABLET ORAL at 03:17

## 2022-01-01 RX ADMIN — ASPIRIN 81 MG: 81 TABLET, COATED ORAL at 09:00

## 2022-01-01 RX ADMIN — LEVOTHYROXINE SODIUM 75 MCG: 0.07 TABLET ORAL at 05:23

## 2022-01-01 RX ADMIN — ACETAMINOPHEN 650 MG: 325 TABLET ORAL at 22:43

## 2022-01-01 RX ADMIN — LEVOTHYROXINE SODIUM 75 MCG: 0.07 TABLET ORAL at 06:50

## 2022-01-01 RX ADMIN — LEVOTHYROXINE SODIUM 75 MCG: 0.07 TABLET ORAL at 06:23

## 2022-01-01 RX ADMIN — QUETIAPINE FUMARATE 25 MG: 25 TABLET ORAL at 21:24

## 2022-01-01 RX ADMIN — ASPIRIN 81 MG: 81 TABLET, COATED ORAL at 08:06

## 2022-01-01 RX ADMIN — Medication 1 MG: at 21:56

## 2022-01-01 RX ADMIN — QUETIAPINE FUMARATE 12.5 MG: 25 TABLET ORAL at 05:23

## 2022-01-01 RX ADMIN — QUETIAPINE 50 MG: 50 TABLET, FILM COATED ORAL at 20:53

## 2022-01-01 RX ADMIN — OXYCODONE HYDROCHLORIDE 2.5 MG: 5 TABLET ORAL at 15:40

## 2022-01-01 RX ADMIN — QUETIAPINE FUMARATE 12.5 MG: 25 TABLET ORAL at 04:06

## 2022-01-01 RX ADMIN — GLYCERIN 1 SUPPOSITORY: 2 SUPPOSITORY RECTAL at 12:48

## 2022-01-01 RX ADMIN — SERTRALINE 37.5 MG: 25 TABLET, FILM COATED ORAL at 08:06

## 2022-01-01 RX ADMIN — ACETAMINOPHEN 1000 MG: 500 TABLET ORAL at 19:33

## 2022-01-01 RX ADMIN — ACETAMINOPHEN 1000 MG: 500 TABLET ORAL at 20:25

## 2022-01-01 RX ADMIN — SENNOSIDES AND DOCUSATE SODIUM 1 TABLET: 50; 8.6 TABLET ORAL at 08:59

## 2022-01-01 RX ADMIN — SENNOSIDES AND DOCUSATE SODIUM 1 TABLET: 50; 8.6 TABLET ORAL at 20:25

## 2022-01-01 RX ADMIN — ASPIRIN 81 MG: 81 TABLET, COATED ORAL at 07:47

## 2022-01-01 RX ADMIN — Medication 1 MG: at 05:23

## 2022-01-01 RX ADMIN — ACETAMINOPHEN 1000 MG: 500 TABLET ORAL at 08:59

## 2022-01-01 RX ADMIN — ACETAMINOPHEN 1000 MG: 500 TABLET ORAL at 09:03

## 2022-01-01 RX ADMIN — OXYCODONE HYDROCHLORIDE 2.5 MG: 5 TABLET ORAL at 16:59

## 2022-01-01 RX ADMIN — MAGNESIUM HYDROXIDE 30 ML: 400 SUSPENSION ORAL at 11:29

## 2022-01-01 RX ADMIN — QUETIAPINE FUMARATE 25 MG: 25 TABLET ORAL at 17:38

## 2022-01-01 RX ADMIN — LISINOPRIL 10 MG: 10 TABLET ORAL at 21:50

## 2022-01-01 RX ADMIN — LISINOPRIL 10 MG: 10 TABLET ORAL at 19:31

## 2022-01-01 RX ADMIN — ASPIRIN 81 MG: 81 TABLET, COATED ORAL at 09:03

## 2022-01-01 RX ADMIN — ACETAMINOPHEN 1000 MG: 500 TABLET ORAL at 19:31

## 2022-01-01 RX ADMIN — LISINOPRIL 10 MG: 10 TABLET ORAL at 22:41

## 2022-01-01 RX ADMIN — FUROSEMIDE 10 MG: 20 TABLET ORAL at 09:04

## 2022-01-01 RX ADMIN — OXYCODONE HYDROCHLORIDE 2.5 MG: 5 TABLET ORAL at 16:34

## 2022-01-01 RX ADMIN — QUETIAPINE FUMARATE 50 MG: 25 TABLET ORAL at 19:33

## 2022-01-01 RX ADMIN — ASPIRIN 81 MG: 81 TABLET, COATED ORAL at 10:17

## 2022-01-01 RX ADMIN — SERTRALINE 37.5 MG: 25 TABLET, FILM COATED ORAL at 10:11

## 2022-01-01 RX ADMIN — ACETAMINOPHEN 1000 MG: 500 TABLET ORAL at 10:10

## 2022-01-01 RX ADMIN — ACETAMINOPHEN 1000 MG: 500 TABLET ORAL at 07:48

## 2022-01-01 RX ADMIN — SENNOSIDES AND DOCUSATE SODIUM 1 TABLET: 50; 8.6 TABLET ORAL at 19:31

## 2022-01-01 RX ADMIN — Medication 1 MG: at 01:31

## 2022-01-01 RX ADMIN — Medication 1 MG: at 03:59

## 2022-01-01 RX ADMIN — SENNOSIDES AND DOCUSATE SODIUM 1 TABLET: 50; 8.6 TABLET ORAL at 19:32

## 2022-01-01 RX ADMIN — LATANOPROST 1 DROP: 50 SOLUTION/ DROPS OPHTHALMIC at 10:11

## 2022-01-01 RX ADMIN — QUETIAPINE FUMARATE 50 MG: 25 TABLET ORAL at 21:49

## 2022-01-01 RX ADMIN — ACETAMINOPHEN 1000 MG: 500 TABLET ORAL at 20:28

## 2022-01-01 RX ADMIN — BNT162B2 30 MCG: 0.23 INJECTION, SUSPENSION INTRAMUSCULAR at 12:29

## 2022-01-01 RX ADMIN — LATANOPROST 1 DROP: 50 SOLUTION/ DROPS OPHTHALMIC at 08:22

## 2022-01-01 RX ADMIN — SERTRALINE 37.5 MG: 25 TABLET, FILM COATED ORAL at 09:04

## 2022-01-01 RX ADMIN — LATANOPROST 1 DROP: 50 SOLUTION/ DROPS OPHTHALMIC at 09:00

## 2022-01-01 RX ADMIN — LISINOPRIL 10 MG: 10 TABLET ORAL at 19:33

## 2022-01-01 RX ADMIN — LISINOPRIL 10 MG: 10 TABLET ORAL at 21:24

## 2022-01-01 RX ADMIN — LEVOTHYROXINE SODIUM 75 MCG: 0.07 TABLET ORAL at 05:44

## 2022-01-01 RX ADMIN — LEVOTHYROXINE SODIUM 75 MCG: 0.07 TABLET ORAL at 06:55

## 2022-01-01 RX ADMIN — SERTRALINE 37.5 MG: 25 TABLET, FILM COATED ORAL at 08:59

## 2022-01-01 RX ADMIN — OXYCODONE HYDROCHLORIDE 2.5 MG: 5 TABLET ORAL at 03:51

## 2022-01-01 RX ADMIN — SENNOSIDES AND DOCUSATE SODIUM 1 TABLET: 50; 8.6 TABLET ORAL at 10:10

## 2022-01-01 ASSESSMENT — ACTIVITIES OF DAILY LIVING (ADL)
ADLS_ACUITY_SCORE: 57
ADLS_ACUITY_SCORE: 45
ADLS_ACUITY_SCORE: 57
ADLS_ACUITY_SCORE: 47
ADLS_ACUITY_SCORE: 57
ADLS_ACUITY_SCORE: 45
ADLS_ACUITY_SCORE: 51
ADLS_ACUITY_SCORE: 57
ADLS_ACUITY_SCORE: 45
ADLS_ACUITY_SCORE: 57
ADLS_ACUITY_SCORE: 53
ADLS_ACUITY_SCORE: 57
ADLS_ACUITY_SCORE: 53
ADLS_ACUITY_SCORE: 45
ADLS_ACUITY_SCORE: 49
ADLS_ACUITY_SCORE: 51
ADLS_ACUITY_SCORE: 53
ADLS_ACUITY_SCORE: 57
ADLS_ACUITY_SCORE: 57
ADLS_ACUITY_SCORE: 53
ADLS_ACUITY_SCORE: 53
ADLS_ACUITY_SCORE: 57
ADLS_ACUITY_SCORE: 45
ADLS_ACUITY_SCORE: 57
ADLS_ACUITY_SCORE: 57
ADLS_ACUITY_SCORE: 47
ADLS_ACUITY_SCORE: 51
ADLS_ACUITY_SCORE: 57
ADLS_ACUITY_SCORE: 57
ADLS_ACUITY_SCORE: 51
ADLS_ACUITY_SCORE: 47
ADLS_ACUITY_SCORE: 57
ADLS_ACUITY_SCORE: 51
ADLS_ACUITY_SCORE: 51
ADLS_ACUITY_SCORE: 57
ADLS_ACUITY_SCORE: 53
ADLS_ACUITY_SCORE: 57
ADLS_ACUITY_SCORE: 45
ADLS_ACUITY_SCORE: 45
DEPENDENT_IADLS:: INDEPENDENT
ADLS_ACUITY_SCORE: 49
ADLS_ACUITY_SCORE: 53
ADLS_ACUITY_SCORE: 47
ADLS_ACUITY_SCORE: 57
ADLS_ACUITY_SCORE: 45
ADLS_ACUITY_SCORE: 51
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
## 2022-07-13 NOTE — ED TRIAGE NOTES
Pt extremely hard of hearing and makes communication difficult.  EMS reports pt lives at family and he was found outside.  Per EMS pt complained of left hip pain.  Attempting to communicate with patient with pen and paper.

## 2022-07-13 NOTE — ED PROVIDER NOTES
Emergency Department Encounter         FINAL IMPRESSION:  Fall, pelvic fracture        ED COURSE AND MEDICAL DECISION MAKING       ED Course as of 07/13/22 2151 Wed Jul 13, 2022   1645 Resident physician met with patient for initial interview and exam.    1725 I met the patient and performed my initial interview and exam.   1734 89-year-old demented male history of diabetes hypertension hyperlipidemia, here via EMS after he  fall outside today.  Daughter now at bedside.  History initially was limited because of patient's deafness.  Unknown how he fell outside.  Unknown LOC.  1 he initially arrived, he was complaining of left shoulder hip and bilateral ankle pain.  No overt head or neck trauma.  When I saw patient, daughter who is POA now at bedside.  Patient is not on any blood thinners.  Sounds like his behavioral changes have worsened.  Saw primary today who recommended increasing Seroquel.  Patient has been starting to wander more often.  Daughter states that at this point she is overwhelmed with patient's cares plan.  Plan for admission. Patient is a DNR DNI   2105 I discussed the case with Dr. Betancourt, hospitalist, who accepts the patient for admission.       -Patient with to pelvic fractures noted to the pelvic rim.  Hemodynamically stable.  Patient otherwise looks comfortable.  We will call patient's daughter for an update.  Patient was otherwise admitted to the hospitalist.    EKG    At the conclusion of the encounter I discussed the results of all the tests and the disposition. The questions were answered. The patient or family acknowledged understanding and was agreeable with the care plan.                  MEDICATIONS GIVEN IN THE EMERGENCY DEPARTMENT:  Medications   QUEtiapine (SEROquel) tablet 50 mg (50 mg Oral Given 7/13/22 2053)       NEW PRESCRIPTIONS STARTED AT TODAY'S ED VISIT:  New Prescriptions    No medications on file       HPI     Patient information obtained from: patient,  "EMS    History limited by: dementia, hard of hearing    Use of Interpretor: N/A     Nic Cleary is a 89 year old male with a pertinent history of Alzheimer's disease, subdural hematoma, hypothyroidism, peripheral polyneuropathy, paroxysmal atrial fibrillation on chronic anticoagulation, PAD, CAD, HTN, HLD, who presents to this ED by EMS for evaluation of fall.    Per EMS, patient was found down on the ground outside his living residence today. At the time, patient endorsed right hip pain.     Per patient, he initially denies pain then during physical exam, endorses bilateral ankle pain, left upper arm pain, and left distal thigh/knee pain. Patient not aware he fell but later remarks his pain may be result of fall.     REVIEW OF SYSTEMS:  Unable to perform ROS: dementia, hard of hearing      MEDICAL HISTORY     History reviewed. No pertinent past medical history.    History reviewed. No pertinent surgical history.         acetaminophen (TYLENOL) 500 MG tablet  aspirin 81 MG EC tablet  furosemide (LASIX) 20 MG tablet  latanoprost (XALATAN) 0.005 % ophthalmic solution  levothyroxine (SYNTHROID/LEVOTHROID) 75 MCG tablet  lisinopril (ZESTRIL) 10 MG tablet  QUEtiapine (SEROQUEL) 25 MG tablet  senna-docusate (SENOKOT-S/PERICOLACE) 8.6-50 MG tablet  senna-docusate (SENOKOT-S/PERICOLACE) 8.6-50 MG tablet  sertraline (ZOLOFT) 25 MG tablet            PHYSICAL EXAM     BP (!) 181/79   Pulse 90   Temp 98.6  F (37  C) (Temporal)   Resp 22   Ht 1.778 m (5' 10\")   Wt 83.9 kg (185 lb)   SpO2 93%   BMI 26.54 kg/m        PHYSICAL EXAM:     General: Patient appears well, nontoxic, comfortable  HEENT: Moist mucous membranes, no tongue swelling.  No head trauma.  No midline neck pain.  Cardiovascular: Normal rate, normal rhythm, no extremity edema.  No appreciable murmur.  Respiratory: No signs of respiratory distress, lungs are clear to auscultation bilaterally with no wheezes rhonchi or rales.  Abdominal: Soft, nontender, " nondistended, no palpable masses, no guarding, no rebound  Musculoskeletal: Pain range of motion of the left lower extremity as well as left upper extremity.  Neurological: Alert and oriented, grossly neurologically intact.  Psychological: Normal affect and mood.  Integument: No rashes appreciated          RESULTS       Labs Ordered and Resulted from Time of ED Arrival to Time of ED Departure   CBC WITH PLATELETS AND DIFFERENTIAL - Abnormal       Result Value    WBC Count 11.1 (*)     RBC Count 3.88 (*)     Hemoglobin 11.7 (*)     Hematocrit 35.5 (*)     MCV 92      MCH 30.2      MCHC 33.0      RDW 13.1      Platelet Count 154      % Neutrophils 78      % Lymphocytes 12      % Monocytes 7      % Eosinophils 2      % Basophils 0      % Immature Granulocytes 1      NRBCs per 100 WBC 0      Absolute Neutrophils 8.7 (*)     Absolute Lymphocytes 1.4      Absolute Monocytes 0.7      Absolute Eosinophils 0.2      Absolute Basophils 0.0      Absolute Immature Granulocytes 0.1      Absolute NRBCs 0.0     COMPREHENSIVE METABOLIC PANEL - Normal    Sodium 138      Potassium 4.1      Chloride 104      Carbon Dioxide (CO2) 26      Anion Gap 8      Urea Nitrogen 17      Creatinine 0.89      Calcium 9.3      Glucose 116      Alkaline Phosphatase 57      AST 16      ALT <9      Protein Total 6.6      Albumin 3.9      Bilirubin Total 0.8      GFR Estimate 81     TROPONIN I - Normal    Troponin I 0.02     MAGNESIUM - Normal    Magnesium 1.9     CK TOTAL - Normal         COVID-19 VIRUS (CORONAVIRUS) BY PCR   ROUTINE UA WITH MICROSCOPIC REFLEX TO CULTURE       CT Abdomen Pelvis w/o Contrast   Final Result   IMPRESSION:    1. Minimally displaced acute fracture of the left pubic body and nondisplaced acute fracture of the left inferior pubic ramus.   2. A small amount of associated blood products are present within the soft tissues in the regions of the fractures and in the anterior lower pelvis.   3. No other acute abnormality  identified in the abdomen or pelvis.      Humerus XR,  G/E 2 views, left   Final Result   IMPRESSION: Osteopenia. No fractures are identified. Degenerative changes in the glenohumeral and acromioclavicular joints.      XR Ankle Left G/E 3 Views   Final Result   IMPRESSION: Osteopenia. No fractures are identified. The ankle mortise is intact. The talar dome is unremarkable.      Ankle XR, G/E 3 views, right   Final Result   IMPRESSION: Osteopenia. No fractures are evident. The ankle mortise is intact. The talar dome is unremarkable.      XR Femur Left 2 Views   Final Result   IMPRESSION: Anatomic alignment left femur. No acute displaced left femur fracture is identified. Largely preserved hip and knee joint spaces for age. No left knee joint effusion. Arterial calcification. Diffuse bone demineralization.      Cervical spine CT w/o contrast   Final Result   IMPRESSION:   1.  No convincing evidence of acute fracture. However, there are multiple levels of mild superior endplate contour deformities which may be chronic given the lack of a distinct fracture lucency or significant paraspinous abnormality. Correlate    clinically. If there is further clinical concern, MRI could be performed to evaluate for marrow edema.      Head CT w/o contrast   Final Result   IMPRESSION:   1.  Amorphous low-attenuation anterior aspect right thalamus may reflect lacunar infarct, age-indeterminate. 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      2.  No other potential evidence of acute intracranial process with chronic appearing brain parenchymal changes as above.      XR Pelvis 1/2 Views   Final Result   IMPRESSION: No acute displaced pelvic or proximal femur fracture identified on this single AP image. Largely preserved hip joint spaces for age. Both obturator rings appear intact. Degenerative change lower lumbar spine. Symmetric SI joints.                        PROCEDURES:  Procedures:  Procedures       I, Kira Perez am  serving as a scribe to document services personally performed by Diogenes Lehman DO, based on my observations and the provider's statements to me.  I, Diogenes Lehman DO, attest that Kira Perez is acting in a scribe capacity, has observed my performance of the services and has documented them in accordance with my direction.    Diogenes Lehman DO  Emergency Medicine  [facility] EMERGENCY ROOM       Diogenes Lehman DO  07/13/22 4460

## 2022-07-14 PROBLEM — S32.9XXA: Status: ACTIVE | Noted: 2022-01-01

## 2022-07-14 NOTE — H&P
[facility] MEDICINE ADMISSION HISTORY AND PHYSICAL       Assessment & Plan      1. Fall and pelvic fracture with bleed. Hemodynamically stable.     CT showed --  Minimally displaced acute fracture of the left pubic body and nondisplaced acute fracture of the left inferior pubic ramus.    A small amount of associated blood products are present within the soft tissues in the regions of the fractures and in the anterior lower pelvis.    2. HTN and HL  3. Hx of PAF and not on anticoagulation  4. History of Dementia - currently agitated and restless, concerns for delirium       Plans  1. Pain control  2. Needs placement  3. High risk of delirium, will get a sitter   4. AM labs   5. Check UA   6. Consider psych eval   7. He was given seroquel 50 mgs, and will add PRNs - 12.5 mgs q8    1129P -- called Roberta - daughter 2x and no answer.       VTE prophylaxis: SCDs  Diet:  Regular   Code Status: DNR/DNI  COVID test result:  negative   COVID vaccination: completed   Barriers to discharge: admitting clinical condition  Discharge Disposition and goals:  Unable to determine at this point, pending clinical progress and response to treatment. Patient may need transfer to SNF or ACR if unsafe to go home and needed treatment inappropriate at home setting OR may need home health care evaluation if care can be delivered at home settings. Consider referral to care manager/    PPE - I was wearing PPE when I met the patient - N95 mask, Surgical mask, Isolation gown, Gloves, Safety glasses.      Care plan was created based on available information provided, including patient's condition at the time of encounter.   This plan was discussed with patient and/or family members using layman's terms and have agreed to proceed.   At the end of night shift (9PM - 730A), this case will be presented to the AM Hospitalist.    It is recommended to revise care plan and review history if there is change in  "condition and/or new clinical information is not available during my encounter.     All or some of home medication/s were not resumed on admission due to safety reasons or contraindications. Dosing and frequency may also have been modified. Please resume/review them during your visit.     70 minutes of total visit duration and greater than 50% was spent in direct evaluation of patient and coordination of care including discussion of diagnostic test results and recommended treatment. .      García Betancourt MD, MPH, FACP, [facility]  Internal Medicine - Hospitalist        Chief Complaint Fall      HISTORY     - I met him in the ED. Per report, he came in with an episode of fall. Details not clear. Also some behavioral issues. Daughter expressed overwhelmed with patient's careplans. Reported DNR/DNI    - When I met him in the ED. He was MCFP through the bed and ready to get out of bed,  \"get me out of here\". He was yelling and unable to redirect. He received 50 mgs of seroquel earlier.     - In the ED, multiple imagings were done. No acute findings reported. WBC was 11.1. EKG showed Afib vs SR with PACs     - Additional imaging was done using CT and showed -     IMPRESSION:   1. Minimally displaced acute fracture of the left pubic body and nondisplaced acute fracture of the left inferior pubic ramus.  2. A small amount of associated blood products are present within the soft tissues in the regions of the fractures and in the anterior lower pelvis.      - ROS --- Unable to call       Past Medical History     Late onset Alzheimer's disease without behavioral disturbance 01/04/2019   Subdural hematoma without coma 01/01/2019   Peripheral polyneuropathy 03/28/2017   Bradycardia 11/23/2016   DNAR (do not attempt resuscitation) 11/21/2016   Paroxysmal atrial fibrillation 11/21/2016   Subclinical hypothyroidism 11/21/2016   Weakness 11/21/2016   Chronic anticoagulation 11/21/2016   Overview:     Formatting of this note might be " different from the original.  Stopped 1/2018 due to subdural hematoma and frequent falls.   Deaf 11/14/2011   Overview:     Formatting of this note might be different from the original.  Right ear   Benign prostatic hyperplasia 11/14/2011   CAD (coronary artery disease) 11/14/2011   Overview:     Formatting of this note might be different from the original.  Ef 42%   Dyslipidemia 11/14/2011   HTN (hypertension)          Surgical History     s/p right inguinal hernia 2004        CATARACT REMOVALT W/CORNEO-SCLERAL          COLONOSCOPY FLEX; DX-SEP PROC 2006        SPY ELITE ANGIOGRAM-UNILATERAL            Family History      Cancer, Prostate Birth Father        Cancer, Other Birth Mother        Coronary Artery Disease Brother 1 gene     Coronary Artery Disease Brother 2            Social History      .  Social History     Socioeconomic History     Marital status:      Spouse name: Not on file     Number of children: Not on file     Years of education: Not on file     Highest education level: Not on file   Occupational History     Not on file   Tobacco Use     Smoking status: Not on file     Smokeless tobacco: Not on file   Substance and Sexual Activity     Alcohol use: Not on file     Drug use: Not on file     Sexual activity: Not on file   Other Topics Concern     Not on file   Social History Narrative     Not on file     Social Determinants of Health     Financial Resource Strain: Not on file   Food Insecurity: Not on file   Transportation Needs: Not on file   Physical Activity: Not on file   Stress: Not on file   Social Connections: Not on file   Intimate Partner Violence: Not on file   Housing Stability: Not on file          Allergies      No Known Allergies      Prior to Admission Medications      No current facility-administered medications on file prior to encounter.  acetaminophen (TYLENOL) 500 MG tablet, Take 1,000 mg by mouth 2 times daily  aspirin 81 MG EC tablet, Take 81 mg by mouth  "daily  furosemide (LASIX) 20 MG tablet, Take 10 mg by mouth every other day 1/2 tab  latanoprost (XALATAN) 0.005 % ophthalmic solution, Place 1 drop into both eyes every morning  levothyroxine (SYNTHROID/LEVOTHROID) 75 MCG tablet, Take 75 mcg by mouth daily before breakfast  lisinopril (ZESTRIL) 10 MG tablet, Take 10 mg by mouth At Bedtime  QUEtiapine (SEROQUEL) 25 MG tablet, Take 25-50 mg by mouth At Bedtime Told 7/13 that could increase to 2 tabs (50 mg) at bedtime.  senna-docusate (SENOKOT-S/PERICOLACE) 8.6-50 MG tablet, Take 1 tablet by mouth every morning  senna-docusate (SENOKOT-S/PERICOLACE) 8.6-50 MG tablet, Take 2 tablets by mouth At Bedtime  sertraline (ZOLOFT) 25 MG tablet, Take 37.5 mg by mouth every morning            Review of Systems     A 12 point comprehensive review of systems was negative except as noted above in HPI.    PHYSICAL EXAMINATION       Vitals      Vitals: BP (!) 181/79   Pulse 90   Temp 98.6  F (37  C) (Temporal)   Resp 22   Ht 1.778 m (5' 10\")   Wt 83.9 kg (185 lb)   SpO2 93%   BMI 26.54 kg/m    BMI= Body mass index is 26.54 kg/m .      Examination     General Appearance:  Alert,no distress  Head:    Normocephalic, without obvious abnormality, atraumatic  EENT:  PERRL, conjunctiva/corneas clear, EOM's intact.   Neck:   Supple, symmetrical, trachea midline, no adenopathy; no NVE  Back:  Symmetric, no curvature, no CVA tenderness  Chest/Lungs: Clear to auscultation bilaterally, respirations unlabored, No tenderness or deformity. No abdominal breathing or use of accessory muscles.   Heart:    irregular rate and rhythm, S1 and S2 normal, no murmur, rub   or gallop  Abdomen: Soft, non-tender, bowel sounds active all four quadrants, not peritoneal on palpation. Not distended  Extremities:  Extremities normal, atraumatic, no swelling   Skin:  Skin color, texture, turgor normal, no rashes or lesion  Neurologic:  Awake and alert,  No lateralizing or localizing signs  Moving extremities, " Pain on his buttock when moving.

## 2022-07-14 NOTE — PROGRESS NOTES
[facility] Daily Progress Note    Assessment/Plan:  89-year-old male with history of dementia, hypertension, diabetes mellitus type 2, hyperlipidemia, who suffered a fall at 2 PM on 7/13/2022 at his home.  Diagnosed with osteopenia, minimally displaced acute fracture left pubic body and left inferior pubic ramus.  Patient admitted for pain control and PT OT assessment with [facility] nursing facility placement.    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  Order Quetiapine 12.5 mg every 8 hours as needed for agitation.  Fall precautions.   1:1 sitter for behaviors, patient safety.     Hypothyroidism  Order PTA medication: Levothyroxine 75 mcg daily    Coronary disease  Cardiomyopathy borderline EF 42%.  Essential hypertension  PTA medication: Order furosemide 10 mg every other day, lisinopril 10 mg nightly    Dyslipidemia  Hearing impairment right ear  Benign prostatic hyperplasia    Diet: Regular Diet Adult  DVT Prophylaxis:  Pneumatic Compression Devices  Code Status: No CPR- Do NOT Intubate    Active Problems:    Fall     LOS: 0 days     Barriers to discharge: Social work and PT/OT evaluation.  Discharge Disposition: anticipate SNF placement.    Subjective:  Nic is laying in bed.  His daughter is present.  She reports increased pain recently this morning.  Patient recently received Norco prior to my arrival.  Otherwise patient had poor sleep overnight.  He is finally resting.  Plan is to wake him for lunchtime meal.    acetaminophen  1,000 mg Oral BID     aspirin  81 mg Oral Daily     furosemide  10 mg Oral  Every Other Day     latanoprost  1 drop Both Eyes QAM     levothyroxine  75 mcg Oral QAM AC     lisinopril  10 mg Oral At Bedtime     QUEtiapine  25-50 mg Oral At Bedtime     sertraline  37.5 mg Oral QAM       Objective:  Vital signs in last 24 hours:  Temp:  [97.2  F (36.2  C)-98.6  F (37  C)] 98.3  F (36.8  C)  Pulse:  [] 73  Resp:  [16-25] 20  BP: (122-181)/(61-93) 135/68  SpO2:  [91 %-95 %] 95 %  Weight:   Weight:   @THISENCWEIGHTS(1)@  Weight change:   Body mass index is 26.54 kg/m .    Intake/Output last 3 shifts:  I/O last 3 completed shifts:  In: 100 [P.O.:100]  Out: 350 [Urine:350]  Intake/Output this shift:  I/O this shift:  In: 60 [P.O.:60]  Out: -     Review of Systems:   As per subjective, all others negative.    Physical Exam:    GENERAL:  Demented, appears comfortable, in no acute distress, appears stated age   HEAD:  Normocephalic, without obvious abnormality, atraumatic   NECK: Supple, symmetrical, trachea midline   LUNGS:   Clear to auscultation bilaterally, no rales, rhonchi, or wheezing, symmetric chest rise on inhalation, respirations unlabored   CHEST WALL:  No tenderness or deformity   HEART:  Regular rate and rhythm, S1 and S2 normal, no murmur, rub, or gallop    ABDOMEN:   Soft, non-tender, bowel sounds active all four quadrants, no masses, no organomegaly, no rebound or guarding   EXTREMITIES: Extremities normal, atraumatic, no cyanosis or edema    SKIN: Dry to touch, no exanthems in the visualized areas   NEURO: Alert, demented, moves all four extremities freely, non-focal   PSYCH: Cooperative, behavior is appropriate      Imaging:  Personally Reviewed.  Results for orders placed or performed during the hospital encounter of 07/13/22   Head CT w/o contrast    Impression    IMPRESSION:  1.  Amorphous low-attenuation anterior aspect right thalamus may reflect lacunar infarct, age-indeterminate. 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    2.  No other potential evidence of acute  intracranial process with chronic appearing brain parenchymal changes as above.   Humerus XR,  G/E 2 views, left    Impression    IMPRESSION: Osteopenia. No fractures are identified. Degenerative changes in the glenohumeral and acromioclavicular joints.   Cervical spine CT w/o contrast    Impression    IMPRESSION:  1.  No convincing evidence of acute fracture. However, there are multiple levels of mild superior endplate contour deformities which may be chronic given the lack of a distinct fracture lucency or significant paraspinous abnormality. Correlate   clinically. If there is further clinical concern, MRI could be performed to evaluate for marrow edema.   XR Femur Left 2 Views    Impression    IMPRESSION: Anatomic alignment left femur. No acute displaced left femur fracture is identified. Largely preserved hip and knee joint spaces for age. No left knee joint effusion. Arterial calcification. Diffuse bone demineralization.   XR Ankle Left G/E 3 Views    Impression    IMPRESSION: Osteopenia. No fractures are identified. The ankle mortise is intact. The talar dome is unremarkable.   Ankle XR, G/E 3 views, right    Impression    IMPRESSION: Osteopenia. No fractures are evident. The ankle mortise is intact. The talar dome is unremarkable.   XR Pelvis 1/2 Views    Impression    IMPRESSION: No acute displaced pelvic or proximal femur fracture identified on this single AP image. Largely preserved hip joint spaces for age. Both obturator rings appear intact. Degenerative change lower lumbar spine. Symmetric SI joints.   CT Abdomen Pelvis w/o Contrast    Impression    IMPRESSION:   1. Minimally displaced acute fracture of the left pubic body and nondisplaced acute fracture of the left inferior pubic ramus.  2. A small amount of associated blood products are present within the soft tissues in the regions of the fractures and in the anterior lower pelvis.  3. No other acute abnormality identified in the abdomen or pelvis.        Lab Results:  Personally Reviewed.   Recent Labs   Lab 07/14/22  0806 07/13/22  1748   WBC 8.1 11.1*   HGB 10.6* 11.7*   HCT 31.9* 35.5*   * 154     Recent Labs   Lab 07/14/22  0806 07/13/22  1748    138   CO2 28 26   BUN 17 17   ALBUMIN 3.6 3.9   ALKPHOS 43* 57   ALT 10 <9   AST 16 16     No results for input(s): INR in the last 168 hours.    I reviewed all labs and imaging studies as of this date and I reviewed all current inpatient medications and updated them    Stanley Do DO, MS  [facility]  Internal Medicine

## 2022-07-14 NOTE — PROGRESS NOTES
PRIMARY DIAGNOSIS: Increased Confusion + Fall  OUTPATIENT/OBSERVATION GOALS TO BE MET BEFORE DISCHARGE:  1. ADLs back to baseline: No    2. Activity and level of assistance: Up with maximum assistance. Consider SW and/or PT evaluation.     3. Pain status: Pain controlled with oral pain medications    4. Return to near baseline physical activity: No     Discharge Planner Nurse   Safe discharge environment identified: No  Barriers to discharge: Yes       Entered by: Paco Barakat RN 07/14/2022 5:56 AM     Pt on 1:1 for behaviors. Hx dementia. Pt has been restless, picking at things in room and intermittently attempting to get out of bed. Pt denies pain and otherwise appears comfortable. Pt is alert and oriented to self only. CT confirmed L pelvic fx. UA collected.

## 2022-07-14 NOTE — PHARMACY-ADMISSION MEDICATION HISTORY
Pharmacy Note - Admission Medication History    Pertinent Provider Information:      Recently started on quetiapine 25 mg at bedtime, was told they could increase to 50 mg starting tonight 7/13/22.     ______________________________________________________________________    Prior To Admission (PTA) med list completed and updated in EMR.       PTA Med List   Medication Sig Last Dose     acetaminophen (TYLENOL) 500 MG tablet Take 1,000 mg by mouth 2 times daily 7/13/2022 at am     aspirin 81 MG EC tablet Take 81 mg by mouth daily 7/13/2022 at Unknown time     furosemide (LASIX) 20 MG tablet Take 10 mg by mouth every other day 1/2 tab 7/12/2022 at 10 mg QOD     latanoprost (XALATAN) 0.005 % ophthalmic solution Place 1 drop into both eyes every morning 7/13/2022 at will bring     levothyroxine (SYNTHROID/LEVOTHROID) 75 MCG tablet Take 75 mcg by mouth daily before breakfast 7/13/2022 at Unknown time     lisinopril (ZESTRIL) 10 MG tablet Take 10 mg by mouth At Bedtime 7/12/2022 at Unknown time     QUEtiapine (SEROQUEL) 25 MG tablet Take 25-50 mg by mouth At Bedtime Told 7/13 that could increase to 2 tabs (50 mg) at bedtime. 7/12/2022 at Unknown time     senna-docusate (SENOKOT-S/PERICOLACE) 8.6-50 MG tablet Take 1 tablet by mouth every morning 7/13/2022 at Unknown time     senna-docusate (SENOKOT-S/PERICOLACE) 8.6-50 MG tablet Take 2 tablets by mouth At Bedtime 7/12/2022 at Unknown time     sertraline (ZOLOFT) 25 MG tablet Take 37.5 mg by mouth every morning 7/13/2022 at Unknown time       Information source(s): Family member and CareEverywhere/SureScripts  Method of interview communication: in-person    Summary of Changes to PTA Med List  New: Xalatan opth, levothyroxine, lisinopril, seroquel, sertraline, aspirin 81 mg EC, Extra strength Tylenol, furosemide, senna  Discontinued: none  Changed: seroquel increased dose, lisinopril takes at bedtime, furosemide dose decreased    Patient was asked about OTC/herbal products  specifically.  PTA med list reflects this.    In the past week, patient estimated taking medication this percent of the time:  greater than 90%.    Allergies were reviewed, assessed, and updated with the patient.      Medications currently not available for use during hospital stay. Family/Patient representative states they will bring latanoprost opthalmic  to [facility]. Bring tomorrow 7/14.     The information provided in this note is only as accurate as the sources available at the time of the update(s).    Thank you for the opportunity to participate in the care of this patient.    Ailyn Medina [facility]  7/13/2022 8:36 PM

## 2022-07-14 NOTE — ED NOTES
AIDET performed, white board updated for rounding. Patient updated on plan of care. Patient's pain assessed. Call light within reach, bed in low position, side rails up. Visitor at bedside: patient's daughter

## 2022-07-14 NOTE — UTILIZATION REVIEW
Admission Status; Secondary Review Determination   Under the authority of the Utilization Management Committee, the utilization review process indicated a secondary review on Nic Cleary. The review outcome is based on review of the medical records, discussions with staff, and applying clinical experience noted on the date of the review.   (x) Inpatient Status Appropriate - This patient's medical care is consistent with medical management for inpatient care and reasonable inpatient medical practice.     RATIONALE FOR DETERMINATION   89 yr old male with HTN, dementia, DM2, HLD who sustained a fall on 7/13/22 and was unable to ambulate.  Displaced acute fracture of left pubic body and left inferior pubic ramus fracture.  Admitted for pain control.  Dementia complicating as having some agitation and needing seroquel and 1:1.  Trending response with analgesia.  Anticipating need for TCU.    At the time of admission with the information available to the attending physician more than 2 nights Hospital complex care was anticipated, based on patient risk of adverse outcome if treated as outpatient and complex care required. Inpatient admission is appropriate based on the Medicare guidelines.   The information on this document is developed by the utilization review team in order for the business office to ensure compliance. This only denotes the appropriateness of proper admission status and does not reflect the quality of care rendered.   The definitions of Inpatient Status and Observation Status used in making the determination above are those provided in the CMS Coverage Manual, Chapter 1 and Chapter 6, section 70.4.   Sincerely,   Marilee Bentley MD  Utilization Review  Physician Advisor  [facility]

## 2022-07-14 NOTE — PROGRESS NOTES
PRIMARY DIAGNOSIS: Increased Confusion + Fall  OUTPATIENT/OBSERVATION GOALS TO BE MET BEFORE DISCHARGE:  1. ADLs back to baseline: No    2. Activity and level of assistance: Up with maximum assistance. Consider SW and/or PT evaluation.     3. Pain status: Pain controlled with oral pain medications    4. Return to near baseline physical activity: No     Discharge Planner Nurse   Safe discharge environment identified: No  Barriers to discharge: Yes       Entered by: Paco Barakat RN 07/14/2022 5:56 AM     Please review provider order for any additional goals.   Nurse to notify provider when observation goals have been met and patient is ready for discharge.

## 2022-07-15 NOTE — CONSULTS
PALLIATIVE CARE CONSULT NOTE     Patient Name: Nic Cleary  Date of Admission: 7/13/2022   Requesting Clinician / Team: Ernestina  Reason for consult:    She is concerned about increasing pain that Nic is experiencing.  She is also concerned about possible pain and suffering that he would experience with rehab from recent injury.  She is requesting comfort care for her father.  We discussed goals of care including options for comfort care administration.  They are agreeable to meet with hospice and palliative care in consultation.  Overall Nic appears to be comfortable at this time.  She is concerned that he may not have had a bowel movement over the past 24 hours.        Impressions and Recommendations     1)   GOALS OF CARE are (see below for full discussion).   ? Comfort focus  ? Plan is to discharge to LTC on [facility]     2)    ADVANCED CARE PLANNING  ? Patient has completed health care directive:  N  ?  Surrogate  health care agent: Daughter Roberta  ? Code Status: DNR DNI     3)    SYMPTOM MANAGEMENT      Palliative care encounter  Comfort care.   Discontinue blood draws and regular vital sign checks.   Oxygen 2 to 4 L/min as needed for shortness of breath and patient comfort  Continue Tylenol as needed, hydrocodone acetaminophen 1 tab every 6 hours as needed  Hydromorphone 0.2 mg every 4 hours as needed  atropine drops 1-2 drops sublingual 3 times a day when necessary.      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  Quetiapine 12.5 mg every 8 hours as needed for agitation. Placed Nursing order to give quetiapine  now  Fall precautions.   Asked Nurse to give PRN quetiapine    Constipation  Comment; No BM for 5 day  Recommend:  Glycerin SUPP prn  Senna D PRN  Placed nursing order to give above       4)    PSYCHOSOCIAL/SPIRITUAL SUPPORT  ? Will request spiritual care support    ? Palliative medicine will continue to follow         Admission Info       History of Present Illness:    Nic Cleary is a 89-year-old male with history of dementia, hypertension, diabetes mellitus type 2, hyperlipidemia, who suffered a fall at 2 PM on 7/13/2022 at his home.  Diagnosed with osteopenia, minimally displaced acute fracture left pubic body and left inferior pubic ramus.  Patient admitted for pain control and PT OT assessment.  After discussion with patient and family today they are requesting comfort care approach.  Palliative care and hospice care consultation was placed.  Comfort medications were placed.     Recent Inpatient Admissions (last 12 mo)    Two ED visits             Interval History:     Hospice consult: Talked with daughter Roberta, review hospice program, services, philosophy, where hospice can take place and choice of agency. She states that plan will be for LTC placement for pt but he does not have funds to cover this. They are not interested in pt going to [facility]. She plans to set up a visit with the [facility] and is open to applying for community funds if available for him there. I did not inquire if he has any  benefits. She is aware that choice of agency may be impacted by placement so will wait to determine that. She is aware that pt continues to have symptoms that are continuing to be addressed to increase comfort prior to discharge and will appreciate the input from palliative. She has [facility] hospice number to call with any further questions. Hospice will continue to follow as needed. Do CM updated.           Palliative Assessment/discussion     *I met with celia and her .  I discussed the reason  for Palliative Care Referral and our role in symptom management, patient family communication, understanding their choices for medical treatment, and providing  guidance in making difficult decisions in the framework of focusing on patient comfort and quality of life.    I dicussed the NH plan and he indicated money is a concern.   I reviewed the hospice note which stated they may be interested in the [facility] if they can have funding.  I let them know that if approved, funding only last 2 weeks and then it is private pay of $600 a day.  They were not aware of that.    I discussed pain control and indicated that pain can worsen delerium.  They agree to low dose oxycodone q 12 scheduled. They are concerned about constipation            Prognosis, goals and planning        Patient's decision making preferences: unable    I have concerns about the patient health literacy today: y    I have concerns about the  family's health literacy today: n    Prognosis, Goals, and/or Advance Care Planning were addressed today: Y    Mood, coping, and/or meaning in the context of serious illness were addressed today: Y    [facility] Symptom data:   Pain: grimace  Dyspnea: N  Nausea: N  Anxiety: moderate   Insomnia: N  Diarrhea:N   :      Functional Status:  Current PPS% (100% normal, 0% death): 30  Baseline PPS% (2 weeks prior to admit): 40  Capacity evaluation:    Patient does  not have decision making capacity based on the following:     Ability to understand relevant information about current  condition? N    Ability to demonstrate understanding of  current  illness and care needs? N    Ability to communicate  options for treatment? N      Social:   Living situation: lives in his daughters basement for 4 years  Baseline function: increased weakness  Home support: daughter and son in law  Marital status: W  Number of children: Dtr                 Review of Systems:     A full 14 point review of systems was otherwise completed and  is negative aside from that mentioned above    -----------------------------------------------------------------------------------------------------------------  I have reviewed and supplemented the documentation in this patient's medical record listed below regarding past medical history, social history, active medical problems, allergies and medications.     Current Problem List:   Active Problems:    Fall    Closed fracture of left pelvis, initial encounter (H)      Medical/Surgical History :  History reviewed. No pertinent past medical history.  History reviewed. No pertinent surgical history.  Relevant Family History  History reviewed. No pertinent family history.  No family status information on file.     Social History:    he    Medication  :  Current Facility-Administered Medications   Medication     acetaminophen (TYLENOL) tablet 650 mg    Or     acetaminophen (TYLENOL) Suppository 650 mg     acetaminophen (TYLENOL) tablet 1,000 mg     aspirin EC tablet 81 mg     atropine 1 % ophthalmic solution 2 drop     [START ON 7/18/2022] bisacodyl (DULCOLAX) suppository 10 mg     carboxymethylcellulose PF (REFRESH PLUS) 0.5 % ophthalmic solution 1-2 drop     HYDROcodone-acetaminophen (NORCO) 5-325 MG per tablet 1 tablet     HYDROmorphone (DILAUDID) injection 0.2 mg     latanoprost (XALATAN) 0.005 % ophthalmic solution 1 drop     levothyroxine (SYNTHROID/LEVOTHROID) tablet 75 mcg     lisinopril (ZESTRIL) tablet 10 mg     melatonin tablet 1 mg     naloxone (NARCAN) injection 0.1 mg     naloxone (NARCAN) injection 0.2 mg     ondansetron (ZOFRAN ODT) ODT tab 4 mg    Or     ondansetron (ZOFRAN) injection 4 mg     prochlorperazine (COMPAZINE) injection 5 mg     prochlorperazine (COMPAZINE) injection 5 mg     QUEtiapine (SEROquel) half-tab 12.5 mg     QUEtiapine (SEROquel) tablet 25-50 mg     senna-docusate (SENOKOT-S/PERICOLACE) 8.6-50 MG per tablet 1 tablet     sertraline (ZOLOFT) half-tab 37.5 mg            Allergies:  No  "Known Allergies  Emergency Contact Info (Pulled from chart)  Extended Emergency Contact Information  Primary Emergency Contact: NATALIE SIMON  Address: [address] [place] [address] [place]  Home Phone: [phone]  Relation: Daughter  Secondary Emergency Contact: BROOKE SIMON  Mobile Phone: [phone]  Relation: Son-in-Law     Evaluation             PERTINENT PHYSICAL EXAMINATION:  Vital Signs: Blood pressure (!) 163/77, pulse 72, temperature 97.2  F (36.2  C), temperature source Oral, resp. rate 16, height 1.778 m (5' 10\"), weight 81 kg (178 lb 9.6 oz), SpO2 90 %.   GENERAL: laying in bed, confused     SKIN: Warm and dry   HEENT: Normocephalic, anicteric sclera, moist mucous membranes  LUNGS: Clear to auscultation anterolaterally; non-labored   CARDIAC: RRR, normal s1/s2, w/o m/r/g   ABDOMINAL: BS (+), soft, non distended, non tender  : church in place  MUSKL: no gross joint deformities   EXTREMITIES: No edema or cyanosis, pulses 2+ and symmetrical  NEUROLOGIC: alert confused  PSYCH: anxious, fidgety         Data Reviewed:     All labs/imaging reviewed in [facility]   Lab Results   Component Value Date    ALBUMIN 3.6 07/14/2022     GFR Estimate   Date Value Ref Range Status   07/14/2022 83 >60 mL/min/1.73m2 Final     Comment:     Effective December 21, 2021 eGFRcr in adults is calculated using the 2021 CKD-EPI creatinine equation which includes age and gender (Gregorio thomas al., [place], DOI: 10.1056/TLMZwa3394842)     Recent Labs   Lab 07/14/22  0806 07/13/22  1748   HGB 10.6* 11.7*             ====================================================  TT: I have personally spent a total of  86 minutes on the unit in review of medical record, consultation with the medical providers and assessment of patient today, with more than 50% of this time spent in counseling, coordination of care, and discussion with Daughter and ANSLEY re: diagnostic results, prognosis, symptom management, risks and benefits of " management options, FFC, GOC , code status and development of plan of care as noted above.  ====================================================    BELKYS Moffett, NPSelvinC, JERI   [facility]  Palliative Medicine  [phone]

## 2022-07-15 NOTE — PROGRESS NOTES
[facility] Daily Progress Note    Assessment/Plan:  89-year-old male with history of dementia, hypertension, diabetes mellitus type 2, hyperlipidemia, who suffered a fall at 2 PM on 7/13/2022 at his home.  Diagnosed with osteopenia, minimally displaced acute fracture left pubic body and left inferior pubic ramus.  Patient admitted for pain control and PT OT assessment.  After discussion with patient and family today they are requesting comfort care approach.  Palliative care and hospice care consultation was placed.  Comfort medications were placed.     Palliative care encounter  Comfort care.   Discontinue blood draws and regular vital sign checks.   Oxygen 2 to 4 L/min as needed for shortness of breath and patient comfort  Continue Tylenol as needed, hydrocodone acetaminophen 1 tab every 6 hours as needed  Hydromorphone 0.2 mg every 4 hours as needed  atropine drops 1-2 drops sublingual 3 times a day when necessary.   Palliative care and hospice consultation.     Left pubic body, left inferior pubic ramus fractures  Fall initial encounter  Osteopenia  Continue pain control   Discontinue PT and OT evaluations per POA request.  Comfort cares.     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  Quetiapine 12.5 mg every 8 hours as needed for agitation.  Fall precautions.      Paroxysmal atrial fibrillation  Discontinue metoprolol per family request.   Monitor heart rate per until protocol.   Continue ASA 81 mg daily.  Not on anticoagulation, falls risk and high bleeding complication risk.     Hypothyroidism  PTA medication: Levothyroxine 75 mcg daily  Coronary disease  Cardiomyopathy borderline EF 42%.  Essential hypertension  PTA medication: discontinue furosemide due to comfort cares.   Continue lisinopril 10 mg  nightly  Dyslipidemia  Hearing impairment right ear  Benign prostatic hyperplasia     Diet: Regular Diet Adult  DVT Prophylaxis:  Pneumatic Compression Devices  Code Status: No CPR- Do NOT Intubate    Active Problems:    Fall    Closed fracture of left pelvis, initial encounter (H)     LOS: 1 day     Barriers to discharge: PT and OT evaluation.  Discharge Disposition: Nursing facility.     Subjective:  Met with Susana marin's power of  and daughter today with her  and patient present.  She is concerned about increasing pain that Nic is experiencing.  She is also concerned about possible pain and suffering that he would experience with rehab from recent injury.  She is requesting comfort care for her father.  We discussed goals of care including options for comfort care administration.  They are agreeable to meet with hospice and palliative care in consultation.  Overall Nic appears to be comfortable at this time.  She is concerned that he may not have had a bowel movement over the past 24 hours.    acetaminophen  1,000 mg Oral BID     aspirin  81 mg Oral Daily     furosemide  10 mg Oral Every Other Day     latanoprost  1 drop Both Eyes QAM     levothyroxine  75 mcg Oral QAM AC     lisinopril  10 mg Oral At Bedtime     metoprolol tartrate  12.5 mg Oral BID     QUEtiapine  25-50 mg Oral At Bedtime     sertraline  37.5 mg Oral QAM       Objective:  Vital signs in last 24 hours:  Temp:  [97.2  F (36.2  C)-98.8  F (37.1  C)] 97.2  F (36.2  C)  Pulse:  [67-92] 72  Resp:  [14-20] 16  BP: (119-163)/(57-77) 163/77  SpO2:  [90 %-97 %] 90 %  Weight:   Weight:   @THISENCWEIGHTS(1)@  Weight change: -2.903 kg (-6 lb 6.4 oz)  Body mass index is 25.63 kg/m .    Intake/Output last 3 shifts:  I/O last 3 completed shifts:  In: 160 [P.O.:160]  Out: 250 [Urine:250]  Intake/Output this shift:  No intake/output data recorded.    Review of Systems:   As per subjective, all others negative.    Physical Exam:    GENERAL:   Alert, appears comfortable, in no acute distress, appears stated age   HEAD:  Normocephalic, without obvious abnormality, atraumatic   THROAT: Lips, mucosa, and tongue normal; teeth and gums normal, mouth moist   NECK: Supple, symmetrical, trachea midline   LUNGS:   Clear to auscultation bilaterally, no rales, rhonchi, or wheezing, symmetric chest rise on inhalation, respirations unlabored   HEART:   Irregular rhythm, S1 and S2 normal, no murmur, rub, or gallop    ABDOMEN:   Soft, non-tender, bowel sounds active all four quadrants, no masses, no organomegaly, no rebound or guarding   EXTREMITIES: Extremities normal, atraumatic, no cyanosis or edema    SKIN: Dry to touch, no exanthems in the visualized areas   NEURO:  Hard of hearing, responds to name, moves all four extremities freely, non-focal   PSYCH: Cooperative, behavior is appropriate      Cardiographics:   I personally reviewed.   ECG: Atrial fibrillation. Rate controlled.    Imaging:  Personally Reviewed.  Results for orders placed or performed during the hospital encounter of 07/13/22   Head CT w/o contrast    Impression    IMPRESSION:  1.  Amorphous low-attenuation anterior aspect right thalamus may reflect lacunar infarct, age-indeterminate. 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    2.  No other potential evidence of acute intracranial process with chronic appearing brain parenchymal changes as above.   Humerus XR,  G/E 2 views, left    Impression    IMPRESSION: Osteopenia. No fractures are identified. Degenerative changes in the glenohumeral and acromioclavicular joints.   Cervical spine CT w/o contrast    Impression    IMPRESSION:  1.  No convincing evidence of acute fracture. However, there are multiple levels of mild superior endplate contour deformities which may be chronic given the lack of a distinct fracture lucency or significant paraspinous abnormality. Correlate   clinically. If there is further clinical concern, MRI could be performed to  evaluate for marrow edema.   XR Femur Left 2 Views    Impression    IMPRESSION: Anatomic alignment left femur. No acute displaced left femur fracture is identified. Largely preserved hip and knee joint spaces for age. No left knee joint effusion. Arterial calcification. Diffuse bone demineralization.   XR Ankle Left G/E 3 Views    Impression    IMPRESSION: Osteopenia. No fractures are identified. The ankle mortise is intact. The talar dome is unremarkable.   Ankle XR, G/E 3 views, right    Impression    IMPRESSION: Osteopenia. No fractures are evident. The ankle mortise is intact. The talar dome is unremarkable.   XR Pelvis 1/2 Views    Impression    IMPRESSION: No acute displaced pelvic or proximal femur fracture identified on this single AP image. Largely preserved hip joint spaces for age. Both obturator rings appear intact. Degenerative change lower lumbar spine. Symmetric SI joints.   CT Abdomen Pelvis w/o Contrast    Impression    IMPRESSION:   1. Minimally displaced acute fracture of the left pubic body and nondisplaced acute fracture of the left inferior pubic ramus.  2. A small amount of associated blood products are present within the soft tissues in the regions of the fractures and in the anterior lower pelvis.  3. No other acute abnormality identified in the abdomen or pelvis.       Lab Results:  Personally Reviewed.   Recent Labs   Lab 07/14/22  0806 07/13/22  1748   WBC 8.1 11.1*   HGB 10.6* 11.7*   HCT 31.9* 35.5*   * 154     Recent Labs   Lab 07/14/22  0806 07/13/22  1748    138   CO2 28 26   BUN 17 17   ALBUMIN 3.6 3.9   ALKPHOS 43* 57   ALT 10 <9   AST 16 16     No results for input(s): INR in the last 168 hours.    I reviewed all labs and imaging studies as of this date and I reviewed all current inpatient medications and updated them    Stanley Do DO, MS  [facility]  Internal Medicine

## 2022-07-15 NOTE — PLAN OF CARE
"Goal Outcome Evaluation:    Plan of Care Reviewed With: patient     Overall Patient Progress: no change    Alert to self, [place], hearing aid in the Left ear, micro-turns as tolerable, pelvis area soft, appeared comfortable, was cooperative at the beginning of the shift then became agitated/restless, attempted to punch and kick nursing staff and yelling \"get out of here, go go go,\"  removed male pure wick, gown and tried to get out of bed, attempted to give PRN Seroquel, but refused, MD notified,  attempted to give IV Dilaudid, but IV was dislodged from the coban, then patient became more cooperative and calm, was able to give PRN 12.5 mg of Seroquel and 1 mg of melatonin in pudding and change patient, repositioned and reapplied male pure wick     "

## 2022-07-15 NOTE — PLAN OF CARE
Problem: Plan of Care - These are the overarching goals to be used throughout the patient stay.    Goal: Optimal Comfort and Wellbeing  Outcome: Ongoing, Progressing  Goal: Readiness for Transition of Care  Outcome: Ongoing, Progressing  Flowsheets (Taken 7/15/2022 1431)  Anticipated Changes Related to Illness: inability to care for self  Intervention: Mutually Develop Transition Plan  Recent Flowsheet Documentation  Taken 7/15/2022 1431 by Lou Patel RN  Anticipated Changes Related to Illness: inability to care for self   Goal Outcome Evaluation:        Oriented to self only, restless and unable to clearly articulate pain, but appears to have pain on moving.  in AM, attending ordered metoprolol. However, daughter present, becomes tearful, reports that she doesn't want him to suffer, reports that he has declined rapidly recently. Pt transitioned to comfort care, likely to discharge to home with hospice.

## 2022-07-15 NOTE — PROGRESS NOTES
Page to provider    [address] callback [phone] NH  No WBS, therapies need this order to see pt. Also systolic  this morning, no apparent symptoms, but also has irregular heartbeat.

## 2022-07-15 NOTE — PROGRESS NOTES
Care Management Follow Up    Length of Stay (days): 1    Expected Discharge Date: 07/16/2022     Concerns to be Addressed:       Patient plan of care discussed at interdisciplinary rounds:    Anticipated Discharge Disposition: Skilled Nursing Facility     Anticipated Discharge Services: Transportation Services  Anticipated Discharge DME:      Patient/family educated on Medicare website which has current facility and service quality ratings:    Education Provided on the Discharge Plan:    Patient/Family in Agreement with the Plan: yes    Referrals Placed by CM/SW:    Private pay costs discussed:     Additional Information:Follow up calls place to [facility] [place] and [address].         Mary Jo Harris RN

## 2022-07-15 NOTE — PROVIDER NOTIFICATION
RM 3110 NH    Reapproached patient, patient continue to refuse cares, becoming more restless/agitated, and trying to get out of bed, orders?     Received advice and order.

## 2022-07-15 NOTE — PROGRESS NOTES
"Page to provider    [address] L[address] callback [phone] NH  Daughter wants palliative/comfort care. Declines metoprolol for pt and declined PT/OT. Reports that pt has declined significantly and states \"his stories don't make sense anymore\"  "

## 2022-07-15 NOTE — PLAN OF CARE
Goal Outcome Evaluation:      Problem: Plan of Care - These are the overarching goals to be used throughout the patient stay.    Goal: Plan of Care Review/Shift Note  Description: The Plan of Care Review/Shift note should be completed every shift.  The Outcome Evaluation is a brief statement about your assessment that the patient is improving, declining, or no change.  This information will be displayed automatically on your shift note.  Outcome: Ongoing, Progressing          Problem: Fracture Stabilization and Management (Hip Fracture Medical Management)  Goal: Fracture Stability  Outcome: Ongoing, Progressing        Patient [facility] to  [facility] at [facility].  Patient is alert to self only.  Unable to follow commands.  Micro turns every 2 hours as tolerated. VSS.  1:1 at bedside.  Patient resting in bed

## 2022-07-15 NOTE — PROGRESS NOTES
Care Management Initial Consult    General Information  Assessment completed with: VM-chart review,                   Advance Care Planning: Discharge to LTC with hospice.        Living Environment:   People in home: child(tenzin), adult  Vanessa  Current living Arrangements: house      Able to return to prior arrangements: no       Family/Social Support:  Care provided by: child(tenzin)  Provides care for: no one  Marital Status:   Children          Description of Support System: Involved    Support Assessment: Adequate family and caregiver support    Current Resources:   Patient receiving home care services: No     Community Resources: None  Equipment currently used at home: walker, rolling  Supplies currently used at home: None    Employment/Financial:  Employment Status: retired        Financial Concerns: other (see comments) (family has concern on how to pay for services)   :    Functional Status:  Prior to admission patient needed assistance:   Dependent ADLs:: Independent  Dependent IADLs:: Independent  Assesssment of Functional Status: Not at baseline with ADL Functioning    Mental Health Status:  Mental Health Status: Current Concern  Mental Health Management: Medication    Chemical Dependency Status:  Chemical Dependency Status: No Current Concerns             Values/Beliefs:  Spiritual, Cultural Beliefs, Scientologist Practices, Values that affect care: yes  Description of Beliefs that Will Affect Care: Moravian            Additional Information: CM bedside assessment with family and patient.  Family prefers hospice and LTC for discharge and comfort measures while in hospital.  Referrals sent for placement.      Mary Jo Harris RN

## 2022-07-15 NOTE — CONSULTS
Talked with daughter Roberta, review hospice program, services, philosophy, where hospice can take place and choice of agency. She states that plan will be for LTC placement for pt but he does not have funds to cover this. They are not interested in pt going to [facility]. She plans to set up a visit with the [facility] and is open to applying for community funds if available for him there. I did not inquire if he has any  benefits. She is aware that choice of agency may be impacted by placement so will wait to determine that. She is aware that pt continues to have symptoms that are continuing to be addressed to increase comfort prior to discharge and will appreciate the input from palliative. She has [facility] hospice number to call with any further questions. Hospice will continue to follow as needed. Do CM updated.     Thank you for this referral and opportunity to work with this pt and family.     Tomeka Ren RN BSN PHN PN  Hospice Referral Specialist  [facility]    [phone]  [email or web address]

## 2022-07-16 NOTE — PLAN OF CARE
Problem: Pain (Hip Fracture Medical Management)  Goal: Acceptable Pain Level  Outcome: Ongoing, Progressing  Intervention: Manage Acute Orthopaedic-Related Pain  Recent Flowsheet Documentation  Taken 7/15/2022 2110 by Katherine Horn RN  Pain Management Interventions: rest  Taken 7/15/2022 1722 by Katherine Horn, RN  Pain Management Interventions:   quiet environment facilitated   rest  Taken 7/15/2022 1633 by Katherine Horn RN  Pain Management Interventions: medication (see MAR)     Problem: Urinary Elimination Impaired (Hip Fracture Medical Management)  Goal: Effective Urinary Elimination  Outcome: Ongoing, Progressing     Pt A & O to self only. Pt is also [place]. Pt endorses mild/moderate pain upon repositioning but otherwise appears pain-free in accordance w/ PAINAD scale. Utilizing scheduled Oxycodone & scheduled APAP w/ effective results. CMS intact to all 4 extremities. Tolerating a regular diet w/ meds crushed in applesauce. Male Purwik in place. VS discontinued but was running hypertensive earlier today. Q2 repositioning as pt tolerates. Pt's daughter became very upset due to patient's outcry of pain upon repositioning, requesting that staff be more gentle. Explained to pt's daughter that pt has/had been given pain medications & some pain is to be expected given pt's fractures. Explained importance of repositioning in the prevention of skin breakdown as well. Pt's daughter appeared still upset but voiced understanding. Anticipating discharge to LTC on hospice upon acceptance from a care facility.

## 2022-07-16 NOTE — PLAN OF CARE
Patient is oriented to self. Remains on comfort cares/bedrest. Repositioning q 2 hours and purewick in place. Patient denies pain. Supportive family at bedside.

## 2022-07-16 NOTE — PROGRESS NOTES
[facility] Daily Progress Note    Assessment/Plan:  89-year-old male with history of dementia, hypertension, diabetes mellitus type 2, hyperlipidemia, who suffered a fall at 2 PM on 7/13/2022 at his home.  Diagnosed with osteopenia, minimally displaced acute fracture left pubic body and left inferior pubic ramus.  Patient admitted for pain control and PT OT assessment.  After discussion with patient and family today they are requesting comfort care approach.  Palliative care and hospice care consultation was placed.  Comfort medications were placed on July 15.  Patient's daughter continues to struggle with overall issue of losing her father in the future and is questioning her decision and requires a lot of reassurance and empathy     Palliative care encounter  Comfort care.   Discontinue blood draws and regular vital sign checks.   Oxygen 2 to 4 L/min as needed for shortness of breath and patient comfort  Continue Tylenol as needed, hydrocodone acetaminophen 1 tab every 6 hours as needed  Hydromorphone 0.2 mg every 4 hours as needed  atropine drops 1-2 drops sublingual 3 times a day when necessary.   Palliative care and hospice consultation.     Left pubic body, left inferior pubic ramus fractures  Fall initial encounter  Osteopenia  Continue pain control   Discontinue PT and OT evaluations per POA request.  Comfort cares.     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  Quetiapine 12.5 mg every 8 hours as needed for agitation.  Fall precautions.      Paroxysmal atrial fibrillation  Discontinue metoprolol per family request.   Monitor heart rate per until protocol.   Continue ASA 81 mg daily.  Not on anticoagulation, falls risk and high bleeding complication risk.     Hypothyroidism  PTA medication:  Levothyroxine 75 mcg daily  Coronary disease  Cardiomyopathy borderline EF 42%.  Essential hypertension  PTA medication: discontinue furosemide due to comfort cares.   Continue lisinopril 10 mg nightly  Dyslipidemia  Hearing impairment right ear  Benign prostatic hyperplasia     Diet: Regular Diet Adult  DVT Prophylaxis:  Pneumatic Compression Devices  Code Status: No CPR- Do NOT Intubate    Active Problems:    Fall    Closed fracture of left pelvis, initial encounter (H)     LOS: 1 day     Barriers to discharge: PT and OT evaluation.  Discharge Disposition: Nursing facility.     Subjective:  Met with patient's daughter at bedside empathized with her, reassured her that she was doing the right decision and making the right decisions for her father.  She felt a little bit better after that.  He is in so much pain that I do not think it is possible for him to get out of bed in a safe manner without being very uncomfortable        acetaminophen  1,000 mg Oral BID     aspirin  81 mg Oral Daily     latanoprost  1 drop Both Eyes QAM     levothyroxine  75 mcg Oral QAM AC     lisinopril  10 mg Oral At Bedtime     oxyCODONE  2.5 mg Oral Q12H     QUEtiapine  25-50 mg Oral At Bedtime     senna-docusate  1 tablet Oral BID     sertraline  37.5 mg Oral QAM       Objective:  Vital signs in last 24 hours:   Not applicable      Body mass index is 25.63 kg/m .    Intake/Output last 3 shifts:  No intake/output data recorded.  Intake/Output this shift:  No intake/output data recorded.    Review of Systems:   As per subjective, all others negative.    Physical Exam:    GENERAL:  Alert, appears comfortable, in no acute distress, appears younger than stated age, very hard of hearing   HEAD:  Normocephalic, without obvious abnormality, atraumatic   THROAT: Lips, mucosa, and tongue normal; teeth and gums normal, mouth moist   NECK: Supple, symmetrical, trachea midline   LUNGS:   Clear to auscultation bilaterally, no rales, rhonchi, or  wheezing, symmetric chest rise on inhalation, respirations unlabored   HEART:   Irregular rhythm, S1 and S2 normal, no murmur, rub, or gallop    ABDOMEN:   Soft, non-tender, bowel sounds active all four quadrants, no masses, no organomegaly, no rebound or guarding   EXTREMITIES: Extremities normal, atraumatic, no cyanosis or edema    SKIN: Dry to touch, no exanthems in the visualized areas   NEURO:  Hard of hearing, responds to name, moves all four extremities freely, non-focal   PSYCH: Cooperative, behavior is appropriate      Cardiographics:   I personally reviewed.   ECG: Atrial fibrillation. Rate controlled.    Imaging:  Personally Reviewed.  Results for orders placed or performed during the hospital encounter of 07/13/22   Head CT w/o contrast    Impression    IMPRESSION:  1.  Amorphous low-attenuation anterior aspect right thalamus may reflect lacunar infarct, age-indeterminate. 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    2.  No other potential evidence of acute intracranial process with chronic appearing brain parenchymal changes as above.   Humerus XR,  G/E 2 views, left    Impression    IMPRESSION: Osteopenia. No fractures are identified. Degenerative changes in the glenohumeral and acromioclavicular joints.   Cervical spine CT w/o contrast    Impression    IMPRESSION:  1.  No convincing evidence of acute fracture. However, there are multiple levels of mild superior endplate contour deformities which may be chronic given the lack of a distinct fracture lucency or significant paraspinous abnormality. Correlate   clinically. If there is further clinical concern, MRI could be performed to evaluate for marrow edema.   XR Femur Left 2 Views    Impression    IMPRESSION: Anatomic alignment left femur. No acute displaced left femur fracture is identified. Largely preserved hip and knee joint spaces for age. No left knee joint effusion. Arterial calcification. Diffuse bone demineralization.   XR Ankle Left G/E 3  Views    Impression    IMPRESSION: Osteopenia. No fractures are identified. The ankle mortise is intact. The talar dome is unremarkable.   Ankle XR, G/E 3 views, right    Impression    IMPRESSION: Osteopenia. No fractures are evident. The ankle mortise is intact. The talar dome is unremarkable.   XR Pelvis 1/2 Views    Impression    IMPRESSION: No acute displaced pelvic or proximal femur fracture identified on this single AP image. Largely preserved hip joint spaces for age. Both obturator rings appear intact. Degenerative change lower lumbar spine. Symmetric SI joints.   CT Abdomen Pelvis w/o Contrast    Impression    IMPRESSION:   1. Minimally displaced acute fracture of the left pubic body and nondisplaced acute fracture of the left inferior pubic ramus.  2. A small amount of associated blood products are present within the soft tissues in the regions of the fractures and in the anterior lower pelvis.  3. No other acute abnormality identified in the abdomen or pelvis.       Lab Results:  Personally Reviewed.   Recent Labs   Lab 07/14/22  0806 07/13/22  1748   WBC 8.1 11.1*   HGB 10.6* 11.7*   HCT 31.9* 35.5*   * 154     Recent Labs   Lab 07/14/22  0806 07/13/22  1748    138   CO2 28 26   BUN 17 17   ALBUMIN 3.6 3.9   ALKPHOS 43* 57   ALT 10 <9   AST 16 16         I reviewed all labs and imaging studies as of this date and I reviewed all current inpatient medications and updated them    Cami Garcia MD    [facility] Service  Internal Medicine

## 2022-07-16 NOTE — PLAN OF CARE
Problem: Plan of Care - These are the overarching goals to be used throughout the patient stay.    Goal: Optimal Comfort and Wellbeing  Outcome: Ongoing, Not Progressing     Problem: Plan of Care - These are the overarching goals to be used throughout the patient stay.    Goal: Absence of Hospital-Acquired Illness or Injury  Outcome: Ongoing, Not Progressing  Nurse repositioned pt every two hours, see flowsheet for detail. No signs of pain evident , per PAINAD. However, pt became restless around 0400. Nurse administered PRN melatonin and Seroquel. Medications effective. Pt was calm. Awaiting long term placement. Nurse will continue to monitor.

## 2022-07-17 NOTE — PROGRESS NOTES
Care Management Follow Up    Length of Stay (days): 3    Expected Discharge Date: 07/17/2022     Concerns to be Addressed:       Patient plan of care discussed at interdisciplinary rounds: Yes    Anticipated Discharge Disposition: Skilled Nursing Facility     Anticipated Discharge Services: Transportation Services  Anticipated Discharge DME:      Patient/family educated on Medicare website which has current facility and service quality ratings:    Education Provided on the Discharge Plan:    Patient/Family in Agreement with the Plan: yes    Referrals Placed by CM/SW:    Private pay costs discussed: Not applicable    Additional Information:  Follow up calls made to SNF referrals.        DARREN Schafer

## 2022-07-17 NOTE — PLAN OF CARE
Problem: Plan of Care - These are the overarching goals to be used throughout the patient stay.    Goal: Plan of Care Review/Shift Note  Description: The Plan of Care Review/Shift note should be completed every shift.  The Outcome Evaluation is a brief statement about your assessment that the patient is improving, declining, or no change.  This information will be displayed automatically on your shift note.  Outcome: Ongoing, Progressing  Goal: Optimal Comfort and Wellbeing  Outcome: Ongoing, Progressing  Intervention: Monitor Pain and Promote Comfort  Recent Flowsheet Documentation  Taken 7/16/2022 2016 by Katherine Horn RN  Pain Management Interventions: rest  Taken 7/16/2022 1931 by Katherine Horn RN  Pain Management Interventions: medication (see MAR)  Taken 7/16/2022 1744 by Katherine Horn RN  Pain Management Interventions: distraction  Taken 7/16/2022 1545 by Katherine Horn RN  Pain Management Interventions: rest    Pt A & O to self only. Pt is also [place]. Pt endorses mild/moderate pain upon repositioning but otherwise appears pain-free in accordance w/ PAINAD scale. Utilizing scheduled Oxycodone & scheduled APAP w/ effective results. CMS intact to all 4 extremities. Mepilex to coccyx is CDI. Tolerating a regular diet w/ meds crushed in applesauce. Male Purwik in place. Q2 repositioning as pt tolerates. Family at the bedside for the majority of the shift. Anticipating discharge to LTC on hospice upon acceptance from a care facility.

## 2022-07-17 NOTE — PLAN OF CARE
Patient is alert to self. Denies pain, no signs of pain evident per PAINAD scale. Around 0400 patient became restless, pulled out external catheter several times & tried to jump OOB. Was redirectable but confused. Q2 hour turns. External catheter in place. Scheduled oxycodone tablet given per order. Awaiting LTC placement & hospice.

## 2022-07-17 NOTE — PLAN OF CARE
Problem: Plan of Care - These are the overarching goals to be used throughout the patient stay.    Goal: Plan of Care Review/Shift Note  Description: The Plan of Care Review/Shift note should be completed every shift.  The Outcome Evaluation is a brief statement about your assessment that the patient is improving, declining, or no change.  This information will be displayed automatically on your shift note.  Outcome: Ongoing, Progressing   On comfort cares, repositioning as tolerated/per family request. Hygiene cares provided. PRN Suppository administered as no BM > 4 days. Abdomen soft/non-tender. BS hypoactive.  Family encouraging oral intake. Scheduled tylenol administered for pain - occasional grimacing with repositioning.

## 2022-07-17 NOTE — PROGRESS NOTES
[facility] Daily Progress Note    Assessment/Plan:  89-year-old male with history of dementia, hypertension, diabetes mellitus type 2, hyperlipidemia, who suffered a fall at 2 PM on 7/13/2022 at his home.  Diagnosed with osteopenia, minimally displaced acute fracture left pubic body and left inferior pubic ramus.  Patient admitted for pain control and PT OT assessment.  After discussion with patient and family they requested comfort care approach.  Palliative care and hospice care consultation was placed.  Comfort medications were placed on July 15.     Palliative care encounter  Comfort care.   Oxygen 2 to 4 L/min as needed for shortness of breath and patient comfort  Hydromorphone 0.2 mg every 4 hours as needed  Oxycodone IR 2.5 mg every 12 hours  Tylenol 1 g twice daily  atropine drops 1-2 drops sublingual 3 times a day when necessary.   Palliative care and hospice consultation.      Left pubic body, left inferior pubic ramus fractures  Fall initial encounter  Osteopenia  Continue pain control  Comfort cares.     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  Quetiapine 12.5 mg every 8 hours as needed for agitation.  Fall precautions.      Paroxysmal atrial fibrillation  Discontinue metoprolol per family request.   Monitor heart rate per until protocol.   Continue ASA 81 mg daily.  Not on anticoagulation, falls risk and high bleeding complication risk.      Hypothyroidism  PTA medication: Levothyroxine 75 mcg daily  Coronary disease  Cardiomyopathy borderline EF 42%.  Essential hypertension  PTA medication: discontinue furosemide due to comfort cares.   Continue lisinopril 10 mg nightly  Dyslipidemia  Hearing impairment right ear  Benign prostatic hyperplasia    Diet: Regular Diet Adult  DVT Prophylaxis:   Pneumatic Compression Devices  Code Status: No CPR- Do NOT Intubate    Active Problems:    Fall    Closed fracture of left pelvis, initial encounter (H)     LOS: 3 days     Barriers to discharge: Hospice likely Monday, 7/18/2022.  Discharge Disposition: Hospice at nursing facility anticipated    Subjective:  No new complaints patient is comfortable at this time.    acetaminophen  1,000 mg Oral BID     aspirin  81 mg Oral Daily     latanoprost  1 drop Both Eyes QAM     levothyroxine  75 mcg Oral QAM AC     lisinopril  10 mg Oral At Bedtime     oxyCODONE  2.5 mg Oral Q12H     QUEtiapine  25-50 mg Oral At Bedtime     senna-docusate  1 tablet Oral BID     sertraline  37.5 mg Oral QAM       Objective:  Vital signs in last 24 hours:     Weight:   Weight:   @THISENCWEIGHTS(1)@  Weight change:   Body mass index is 25.63 kg/m .    Intake/Output last 3 shifts:  I/O last 3 completed shifts:  In: -   Out: 800 [Urine:800]  Intake/Output this shift:  No intake/output data recorded.    Review of Systems:   As per subjective, all others negative.    Physical Exam:    GENERAL:  Alert, appears comfortable, in no acute distress, appears stated age   HEAD:  Normocephalic, without obvious abnormality, atraumatic   NECK: Supple, symmetrical, trachea midline   BACK:   Symmetric, no curvature, ROM normal   LUNGS:   Clear to auscultation bilaterally, no rales, rhonchi, or wheezing, symmetric chest rise on inhalation, respirations unlabored   HEART:   Irregular rhythm, S1 and S2 normal, no murmur, rub, or gallop    ABDOMEN:   Soft, non-tender, bowel sounds active all four quadrants, no masses, no organomegaly, no rebound or guarding   EXTREMITIES: Extremities normal, atraumatic, no cyanosis or edema    SKIN: Dry to touch, no exanthems in the visualized areas   NEURO: Alert, oriented x3, moves all four extremities freely, non-focal   PSYCH: Cooperative, behavior is appropriate      Imaging:  Personally Reviewed.  Results for orders placed or  performed during the hospital encounter of 07/13/22   Head CT w/o contrast    Impression    IMPRESSION:  1.  Amorphous low-attenuation anterior aspect right thalamus may reflect lacunar infarct, age-indeterminate. 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    2.  No other potential evidence of acute intracranial process with chronic appearing brain parenchymal changes as above.   Humerus XR,  G/E 2 views, left    Impression    IMPRESSION: Osteopenia. No fractures are identified. Degenerative changes in the glenohumeral and acromioclavicular joints.   Cervical spine CT w/o contrast    Impression    IMPRESSION:  1.  No convincing evidence of acute fracture. However, there are multiple levels of mild superior endplate contour deformities which may be chronic given the lack of a distinct fracture lucency or significant paraspinous abnormality. Correlate   clinically. If there is further clinical concern, MRI could be performed to evaluate for marrow edema.   XR Femur Left 2 Views    Impression    IMPRESSION: Anatomic alignment left femur. No acute displaced left femur fracture is identified. Largely preserved hip and knee joint spaces for age. No left knee joint effusion. Arterial calcification. Diffuse bone demineralization.   XR Ankle Left G/E 3 Views    Impression    IMPRESSION: Osteopenia. No fractures are identified. The ankle mortise is intact. The talar dome is unremarkable.   Ankle XR, G/E 3 views, right    Impression    IMPRESSION: Osteopenia. No fractures are evident. The ankle mortise is intact. The talar dome is unremarkable.   XR Pelvis 1/2 Views    Impression    IMPRESSION: No acute displaced pelvic or proximal femur fracture identified on this single AP image. Largely preserved hip joint spaces for age. Both obturator rings appear intact. Degenerative change lower lumbar spine. Symmetric SI joints.   CT Abdomen Pelvis w/o Contrast    Impression    IMPRESSION:   1. Minimally displaced acute fracture of  the left pubic body and nondisplaced acute fracture of the left inferior pubic ramus.  2. A small amount of associated blood products are present within the soft tissues in the regions of the fractures and in the anterior lower pelvis.  3. No other acute abnormality identified in the abdomen or pelvis.       Lab Results:  Personally Reviewed.   Recent Labs   Lab 07/14/22  0806 07/13/22  1748   WBC 8.1 11.1*   HGB 10.6* 11.7*   HCT 31.9* 35.5*   * 154     Recent Labs   Lab 07/14/22  0806 07/13/22  1748    138   CO2 28 26   BUN 17 17   ALBUMIN 3.6 3.9   ALKPHOS 43* 57   ALT 10 <9   AST 16 16     No results for input(s): INR in the last 168 hours.    I reviewed all labs and imaging studies as of this date and I reviewed all current inpatient medications and updated them      Stanley Do DO, MS  [facility] Service  Internal Medicine

## 2022-07-18 PROBLEM — F03.90 DEMENTIA WITHOUT BEHAVIORAL DISTURBANCE (H): Status: ACTIVE | Noted: 2022-01-01

## 2022-07-18 PROBLEM — K59.09 OTHER CONSTIPATION: Status: ACTIVE | Noted: 2022-01-01

## 2022-07-18 NOTE — DISCHARGE SUMMARY
[facility] MEDICINE  DISCHARGE SUMMARY     Primary Care Physician: Asiya Tan  Admission Date: 7/13/2022   Discharge Provider: Roge Berger MD Discharge Date: 7/18/2022   Diet:   Active Diet and Nourishment Order   Procedures     Regular Diet Adult     Advance Diet as Tolerated     Code Status: No CPR- Do NOT Intubate   Activity: DCACTIVITY: Activity as tolerated  Fall precautions.  Bedrest.      Condition at Discharge: Stable     REASON FOR PRESENTATION(See Admission Note for Details)   Fall and pain    PRINCIPAL & ACTIVE DISCHARGE DIAGNOSES     Active Problems:    Fall    Closed fracture of left pelvis, initial encounter (H)    Dementia without behavioral disturbance (H)    Other constipation  Comfort care status  Osteopenia  Mood disorder  Acute metabolic encephalopathy  Paroxysmal atrial fibrillation  Coronary artery disease  Cardiomyopathy  Essential hypertension  Hypothyroidism  Hyperlipidemia  Hearing impairment  BPH    PENDING LABS     Unresulted Labs Ordered in the Past 30 Days of this Admission     No orders found from 6/13/2022 to 7/14/2022.        PROCEDURES ( this hospitalization only)      None    RECOMMENDATIONS TO OUTPATIENT PROVIDER FOR F/U VISIT   Follow-up Appointments     Follow Up and recommended labs and tests      1.  Hospice to follow at facility.  2.  Standard site provider follow-up per protocols.               DISPOSITION     Skilled Nursing Facility    SUMMARY OF HOSPITAL COURSE:      89-year-old male with history of dementia, hypertension, diabetes mellitus type 2, hyperlipidemia, who suffered a fall at 2 PM on 7/13/2022 at his home.  Diagnosed with osteopenia, minimally displaced acute fracture left pubic body and left inferior pubic ramus.  Patient admitted for pain control and PT OT assessment.  After discussion with patient and family they requested comfort care approach.  Palliative care and hospice care consultation was placed.  Comfort  medications were placed on July 15.  Seroquel and oxycodone have been helpful for symptom control.  Patient will be discharging to facility today 7/18.  Medically stable.  Given a COVID booster.    Discharge Medications with Med changes:     Current Discharge Medication List      START taking these medications    Details   !! acetaminophen (TYLENOL) 325 MG tablet Take 2 tablets (650 mg) by mouth every 6 hours as needed for mild pain or other (and adjunct with moderate or severe pain or per patient request)    Associated Diagnoses: Closed fracture of left pelvis, initial encounter (H)      HYDROcodone-acetaminophen (NORCO) 5-325 MG tablet Take 1 tablet by mouth every 6 hours as needed for moderate to severe pain or breakthrough pain  Qty: 15 tablet, Refills: 0    Associated Diagnoses: Closed fracture of left pelvis, initial encounter (H)      magnesium hydroxide (MILK OF MAGNESIA) 400 MG/5ML suspension Take 30 mLs by mouth daily as needed for constipation    Associated Diagnoses: Other constipation      melatonin 1 MG TABS tablet Take 1 tablet (1 mg) by mouth nightly as needed for sleep    Associated Diagnoses: Closed fracture of left pelvis, initial encounter (H)      oxyCODONE (ROXICODONE) 5 MG tablet Take 0.5 tablets (2.5 mg) by mouth every 12 hours  Qty: 6 tablet, Refills: 0    Associated Diagnoses: Closed fracture of left pelvis, initial encounter (H)       !! - Potential duplicate medications found. Please discuss with provider.      CONTINUE these medications which have CHANGED    Details   !! QUEtiapine (SEROQUEL) 25 MG tablet Take 2 tablets (50 mg) by mouth At Bedtime Told 7/13 that could increase to 2 tabs (50 mg) at bedtime.  Qty: 7 tablet, Refills: 0    Associated Diagnoses: Dementia without behavioral disturbance, unspecified dementia type (H)      !! QUEtiapine (SEROQUEL) 25 MG tablet Take 1 tablet (25 mg) by mouth every 6 hours as needed (Agitation)  Qty: 10 tablet, Refills: 0    Associated Diagnoses:  Dementia without behavioral disturbance, unspecified dementia type (H)      senna-docusate (SENOKOT-S/PERICOLACE) 8.6-50 MG tablet Take 1 tablet by mouth 2 times daily    Associated Diagnoses: Other constipation       !! - Potential duplicate medications found. Please discuss with provider.      CONTINUE these medications which have NOT CHANGED    Details   !! acetaminophen (TYLENOL) 500 MG tablet Take 1,000 mg by mouth 2 times daily      aspirin 81 MG EC tablet Take 81 mg by mouth daily      latanoprost (XALATAN) 0.005 % ophthalmic solution Place 1 drop into both eyes every morning      levothyroxine (SYNTHROID/LEVOTHROID) 75 MCG tablet Take 75 mcg by mouth daily before breakfast      lisinopril (ZESTRIL) 10 MG tablet Take 10 mg by mouth At Bedtime      sertraline (ZOLOFT) 25 MG tablet Take 37.5 mg by mouth every morning       !! - Potential duplicate medications found. Please discuss with provider.      STOP taking these medications       furosemide (LASIX) 20 MG tablet Comments:   Reason for Stopping:                 Rationale for medication changes:      Pain control for pelvic fractures and agitation management.      Consults     PHYSICAL THERAPY ADULT IP CONSULT  OCCUPATIONAL THERAPY ADULT IP CONSULT  SOCIAL WORK IP CONSULT  PALLIATIVE CARE ADULT IP CONSULT  INPATIENT HOSPICE ADULT CONSULT      Immunizations given this encounter     Most Recent Immunizations   Administered Date(s) Administered     COVID-19,PF,Pfizer 12+ Yrs (2022 and After) 07/18/2022           Anticoagulation Information      Recent INR results: No results for input(s): INR in the last 168 hours.  Warfarin doses (if applicable) or name of other anticoagulant: na      SIGNIFICANT IMAGING FINDINGS     Results for orders placed or performed during the hospital encounter of 07/13/22   Head CT w/o contrast    Impression    IMPRESSION:  1.  Amorphous low-attenuation anterior aspect right thalamus may reflect lacunar infarct, age-indeterminate. Old  left lentiform nuclear lacunar infarct.    2.  No other potential evidence of acute intracranial process with chronic appearing brain parenchymal changes as above.   Humerus XR,  G/E 2 views, left    Impression    IMPRESSION: Osteopenia. No fractures are identified. Degenerative changes in the glenohumeral and acromioclavicular joints.   Cervical spine CT w/o contrast    Impression    IMPRESSION:  1.  No convincing evidence of acute fracture. However, there are multiple levels of mild superior endplate contour deformities which may be chronic given the lack of a distinct fracture lucency or significant paraspinous abnormality. Correlate   clinically. If there is further clinical concern, MRI could be performed to evaluate for marrow edema.   XR Femur Left 2 Views    Impression    IMPRESSION: Anatomic alignment left femur. No acute displaced left femur fracture is identified. Largely preserved hip and knee joint spaces for age. No left knee joint effusion. Arterial calcification. Diffuse bone demineralization.   XR Ankle Left G/E 3 Views    Impression    IMPRESSION: Osteopenia. No fractures are identified. The ankle mortise is intact. The talar dome is unremarkable.   Ankle XR, G/E 3 views, right    Impression    IMPRESSION: Osteopenia. No fractures are evident. The ankle mortise is intact. The talar dome is unremarkable.   XR Pelvis 1/2 Views    Impression    IMPRESSION: No acute displaced pelvic or proximal femur fracture identified on this single AP image. Largely preserved hip joint spaces for age. Both obturator rings appear intact. Degenerative change lower lumbar spine. Symmetric SI joints.   CT Abdomen Pelvis w/o Contrast    Impression    IMPRESSION:   1. Minimally displaced acute fracture of the left pubic body and nondisplaced acute fracture of the left inferior pubic ramus.  2. A small amount of associated blood products are present within the soft tissues in the regions of the fractures and in the  anterior lower pelvis.  3. No other acute abnormality identified in the abdomen or pelvis.       SIGNIFICANT LABORATORY FINDINGS     Most Recent 3 CBC's:Recent Labs   Lab Test 07/14/22  0806 07/13/22  1748   WBC 8.1 11.1*   HGB 10.6* 11.7*   MCV 91 92   * 154     Most Recent 3 BMP's:Recent Labs   Lab Test 07/14/22  0806 07/13/22  1748    138   POTASSIUM 4.0 4.1   CHLORIDE 103 104   CO2 28 26   BUN 17 17   CR 0.81 0.89   ANIONGAP 6 8   BIANCA 8.9 9.3    116     Most Recent 2 LFT's:Recent Labs   Lab Test 07/14/22  0806 07/13/22  1748   AST 16 16   ALT 10 <9   ALKPHOS 43* 57   BILITOTAL 1.4* 0.8     Most Recent 3 INR's:No lab results found.      Discharge Orders        General info for SNF    Length of Stay Estimate: Long Term Care  Condition at Discharge: Stable  Level of care:skilled   Rehabilitation Potential: Poor  Admission H&P remains valid and up-to-date: Yes  Recent Chemotherapy: N/A  Use Nursing Home Standing Orders: Yes     Mantoux instructions    Give two-step Mantoux (PPD) Per Facility Policy Yes     Reason for your hospital stay    Pelvic fractures with dementia.     Follow Up and recommended labs and tests    1.  Hospice to follow at facility.  2.  Standard site provider follow-up per protocols.     Activity - Up with nursing assistance     No CPR- Do NOT Intubate     Fall precautions     Advance Diet as Tolerated    Follow this diet upon discharge: Orders Placed This Encounter      Regular Diet Adult       Examination   Physical Exam      Wt Readings from Last 4 Encounters:   07/18/22 76.1 kg (167 lb 12.8 oz)       Constitutional: awake, alert, cooperative, no apparent distress, appears stated age and thin, pleasantly confused.  Daughter present for visit.  Remainder of exam deferred due to Comfort Care status.      Please see EMR for more detailed significant labs, imaging, consultant notes etc.    IRoge MD, personally saw the patient today and spent greater than 30  minutes discharging this patient.    Roge Berger MD  [facility]    CC:Asiya Tan

## 2022-07-18 NOTE — CONSULTS
Care Management Discharge Note    Discharge Date: 07/18/2022       Discharge Disposition: Hospice, Long Term Care- [facility]     Discharge Services:  ([facility] with [facility])    Discharge DME:  (per hospice)    Discharge Transportation: agency (HE stretcher (dementia/hospice)) at 6:30pm    Private pay costs discussed: private pay for LTC placement.     PAS Confirmation Code:  ([id number])  Patient/family educated on Medicare website which has current facility and service quality ratings: yes (CM reviewed with juan c Granados)    Education Provided on the Discharge Plan:  Daughter confirms discharge plan to [facility] with [facility]. AVS per bedside RN.   Persons Notified of Discharge Plans: patient and daughter   Patient/Family in Agreement with the Plan: yes (juan c Granados)    Handoff Referral Completed: CM worked primarily with juan c Granados at bedside. CM coordinated with LTC placement. CM coordinated with Hospice. CM updated MD, bedside RN, Charge RN and HUC. CM faxed discharge orders to LTC.     Additional Information:  IP Consult to  re: discharge planning. Initial CM assessment was completed by CM on 7/15. Hospice Consult on 7/15.    CM met with patient and daughter Roberta (at bedside). Daughter states someone called her about hospice but she has lots of concerns re: placement, private duty care (as he could come home with her if she had help; he lives with her), VA benefits.    [facility]- wait list for LTC, not accepting hospice patients at this time.   [facility]- no bed availability currently or anticipated.   [facility]- CM left VM with admissions requesting return call to CM.       CM sent referral to Financial Counselor inquiring about if able to reach out to juan c Granados.   CM provided daughter with resources for private duty home care, VA  Report to Sheridan PERES and Kina JENKINS RN   contracted SNF, [facility]'s Service contact information.   CM sent additional referrals for placement. CM also sent referral to [facility] daughter request.     [facility]. Will need Covid booster. Daughter agreeable with patient receiving Covid booster. CM updated MD.   [facility] is coming to meet with patient and daughter at hospital at 12:30pm.     HE stretcher at 6:30pm. [facility] will be sending RN to [facility] for start of care visit at 7:30pm this evening.     CM completed PCS.   CM completed PAS= [id number]      Daughter confirms plan for discharge to [facility] LTC, private pay with $7000 deposit required, [facility] services (aware of start of care visit at 7:30pm and transport at 6:30pm).   Linda Angeles, RN

## 2022-07-18 NOTE — PLAN OF CARE
Goal Outcome Evaluation:    Plan of Care Reviewed With: patient     Overall Patient Progress: improving    Outcome Evaluation: Pain control    Patient calm throughout the day, oriented to self. Scheduled Tylenol utilized for pain control. Per PAINAD, only pain present is during repositions/ movement. PRN Milk of Mag administered d/t constipation and family request. Hospice meeting completed during the day. Covid booster shot administered by charge RN. Alarms on for patient safety, anticipated discharge later this evening.

## 2022-07-18 NOTE — PLAN OF CARE
Problem: Plan of Care - These are the overarching goals to be used throughout the patient stay.    Goal: Optimal Comfort and Wellbeing  Outcome: Ongoing, Progressing  Intervention: Monitor Pain and Promote Comfort  Recent Flowsheet Documentation  Taken 7/18/2022 1630 by Maximo Flynn RN  Pain Management Interventions:   distraction   emotional support   repositioned   rest     Problem: Risk for Delirium  Goal: Optimal Coping  Outcome: Ongoing, Progressing   Goal Outcome Evaluation:      Patient is alert to self only. Frequent reorientation provided. Patient is incontinent but is able to use the urinal by himself sometimes. Patient was also restless today and oral Seroquel was provided to reduce risk for patient getting out of bed. Pending discharge to TCU at 1830 today.  Maximo Flynn RN

## 2022-07-18 NOTE — PLAN OF CARE
Problem: Risk for Delirium  Goal: Improved Behavioral Control  Outcome: Ongoing, Not Progressing  Intervention: Prevent and Manage Agitation  Recent Flowsheet Documentation  Taken 7/17/2022 1600 by Katherine Horn RN  Environment Familiarity/Consistency: daily routine followed     Pt A & O to self only. Pt is also [place]. Pt endorses mild/moderate pain upon repositioning but otherwise appears pain-free in accordance w/ PAINAD scale. Utilizing scheduled Oxycodone & scheduled APAP w/ effective results. CMS intact to all 4 extremities; Utilizing Doppler for pedal pulses. Pt still has not had a BM since suppository. Pt was able to get up to the bedside commode w/ an assist of 2 & EZ stand & pass flatus. Mepilex to coccyx is CDI. Tolerating a regular diet w/ meds crushed in applesauce. Male Purwik in place. Q2 repositioning as pt tolerates. Family at the bedside for the majority of the shift. Anticipating discharge to LTC on hospice upon acceptance from a care facility.

## 2022-07-18 NOTE — PLAN OF CARE
Goal Outcome Evaluation:    Plan of Care Reviewed With: patient     Overall Patient Progress: no change    On comfort cares, Oriented to self, able to understand when lowered voice, hearing aid in the left ear, Q 2 turns as tolerable, removed male pure wick as patient kept removing it, provided incontinent cares as needed, became restless and attempted to get out of bed x 1, gave scheduled oxy x 1, appeared comfortable afterwards, No BM this shift, abd soft and non- tender, cooperative and able to take medications in pudding

## 2022-07-19 PROBLEM — I48.0 PAROXYSMAL ATRIAL FIBRILLATION (H): Status: ACTIVE | Noted: 2022-01-01

## 2022-07-19 PROBLEM — I73.9 PERIPHERAL VASCULAR DISEASE (H): Status: ACTIVE | Noted: 2022-01-01

## 2022-07-19 NOTE — PROGRESS NOTES
[facility]    Background: Care Coordination referral placed from [facility] discharge report for reason of patient meeting criteria for a TCM outreach call by [facility] team.    Assessment: Upon chart review, CCR[facility] Team member will cancel/close the referral for TCM outreach due to reason below:    Patient has discharged to a Group home, Memory Care or Nursing Home    Plan: Care Coordination referral for TCM outreach canceled.      Ale Yuen  Community Health Worker  [facility]  Ph: [phone]    *Connected Care Resource Team does NOT follow patient ongoing. Referrals are identified based on internal discharge reports and the outreach is to ensure patient has an understanding of their discharge instructions.

## 2022-07-19 NOTE — LETTER
7/19/2022        RE: Nic Cleary  [address] [facility] [address]        Code Status:  DNR/DNI  Visit Type: Establish Care     Facility:   [facility] () [[id number]]        History of Present Illness:   Hospital Admission Date: 7/13/2022     Hospital Discharge Date: 7/18/2022      Nic Cleary is a 89 year old male with past medical history for dementia, HTN, DM2, HLD.  He was recently hospitalized after a fall at home and was found to have a displaced acute fracture of left pubic body and left inferior pubic ramus.  Palliative care was consulted and family ultimately decided on hospice comfort care.  He was signed on to [facility] and moved to LT.      Today, his daughter is at his bed side and states overall she feels his pain is well managed.  She reports that he hasn't eaten much today and he is in and out of consciousness.  He does wake to my touch however does not respond to any questions.  He is able to take his medications from nursing.  Daughter also reports apneic breathing when patient is sleeping.      History reviewed. No pertinent past medical history.  History reviewed. No pertinent surgical history.  History reviewed. No pertinent family history.  Social History     Socioeconomic History     Marital status:      Spouse name: Not on file     Number of children: Not on file     Years of education: Not on file     Highest education level: Not on file   Occupational History     Not on file   Tobacco Use     Smoking status: Never Smoker     Smokeless tobacco: Never Used   Substance and Sexual Activity     Alcohol use: Not on file     Drug use: Not on file     Sexual activity: Not on file   Other Topics Concern     Not on file   Social History Narrative     Not on file     Social Determinants of Health     Financial Resource Strain: Not on file   Food Insecurity: Not on file   Transportation Needs: Not on file   Physical Activity: Not on file   Stress: Not on file    Social Connections: Not on file   Intimate Partner Violence: Not on file   Housing Stability: Not on file       Current Outpatient Medications   Medication Sig Dispense Refill     acetaminophen (TYLENOL) 325 MG suppository Place 650 mg rectally every 6 hours as needed for fever       acetaminophen (TYLENOL) 325 MG tablet Take 2 tablets (650 mg) by mouth every 6 hours as needed for mild pain or other (and adjunct with moderate or severe pain or per patient request)       acetaminophen (TYLENOL) 500 MG tablet Take 1,000 mg by mouth 2 times daily       aspirin 81 MG EC tablet Take 81 mg by mouth daily       bisacodyl (DULCOLAX) 10 MG suppository Place 10 mg rectally daily as needed for constipation       HYDROcodone-acetaminophen (NORCO) 5-325 MG tablet Take 1 tablet by mouth every 6 hours as needed for moderate to severe pain or breakthrough pain 15 tablet 0     hyoscyamine (LEVSIN) 0.125 MG tablet Take 0.125 mg by mouth every 4 hours as needed for cramping       latanoprost (XALATAN) 0.005 % ophthalmic solution Place 1 drop into both eyes every morning       levothyroxine (SYNTHROID/LEVOTHROID) 75 MCG tablet Take 75 mcg by mouth daily before breakfast       lisinopril (ZESTRIL) 10 MG tablet Take 10 mg by mouth At Bedtime       magnesium hydroxide (MILK OF MAGNESIA) 400 MG/5ML suspension Take 30 mLs by mouth daily as needed for constipation       melatonin 1 MG TABS tablet Take 1 tablet (1 mg) by mouth nightly as needed for sleep       morphine 5 MG solu-tab Take 2.5 mg by mouth every hour as needed for shortness of breath / dyspnea or moderate to severe pain       prochlorperazine (COMPAZINE) 10 MG tablet Take 10 mg by mouth every 6 hours as needed for nausea or vomiting       QUEtiapine (SEROQUEL) 25 MG tablet Take 2 tablets (50 mg) by mouth At Bedtime Told 7/13 that could increase to 2 tabs (50 mg) at bedtime. 7 tablet 0     QUEtiapine (SEROQUEL) 25 MG tablet Take 1 tablet (25 mg) by mouth every 6 hours as needed  "(Agitation) 10 tablet 0     senna-docusate (SENOKOT-S/PERICOLACE) 8.6-50 MG tablet Take 1 tablet by mouth 2 times daily       sertraline (ZOLOFT) 25 MG tablet Take 37.5 mg by mouth every morning       oxyCODONE (ROXICODONE) 5 MG tablet Take 0.5 tablets (2.5 mg) by mouth every 12 hours (Patient not taking: Reported on 7/19/2022) 6 tablet 0     No Known Allergies  Immunization History   Administered Date(s) Administered     COVID-19,PF,Pfizer (12+ Yrs) 01/26/2021, 02/19/2021, 10/15/2021     COVID-19,PF,Pfizer 12+ Yrs (2022 and After) 07/18/2022     FLU 6-35 months 09/24/2009, 09/20/2011     FLUAD(HD)65+ QUAD 09/27/2020     Flu 65+ Years 09/20/2019     Flu, Unspecified 09/25/2017, 09/24/2018     Influenza (High Dose) 3 valent vaccine 09/29/2016, 09/17/2021     Influenza (IIV3) PF 10/19/2005, 10/27/2006, 10/23/2007, 10/28/2008, 10/19/2010, 09/20/2012     Influenza Vaccine IM > 6 months Valent IIV4 (Alfuria,Fluzone) 09/17/2013, 09/09/2014, 09/22/2015, 09/25/2017, 09/24/2018     Influenza Vaccine, 6+MO IM (QUADRIVALENT W/PRESERVATIVES) 09/17/2013     Influenza, Quad, High Dose, Pf, 65yr+ (Fluzone HD) 09/17/2021     Pneumo Conj 13-V (2010&after) 09/29/2016, 09/29/2016     Pneumococcal 23 valent 07/22/2014, 07/22/2014, 02/03/2015, 02/03/2015     Td (Adult), Adsorbed 05/19/2003     Tdap (Adacel,Boostrix) 05/29/2015, 05/29/2015     Zoster vaccine, live 11/10/2009         Post Discharge Medication Reconciliation Status: discharge medications reconciled, continue medications without change.    Medications list and allergies in the facility chart have been reviewed.  Please see facility EMR for most up to date list.         Review of Systems   Unable to obtain due to dementia and in and out of consciousness    Physical Exam  Vital signs:/66   Pulse 84   Temp 97  F (36.1  C)   Resp 24   Ht 1.778 m (5' 10\")   Wt 75.8 kg (167 lb)   SpO2 93%   BMI 23.96 kg/m     GENERAL APPEARANCE: thin, elderly male, in no acute " distress.  HEENT: normocephalic, atraumatic   sclerae anicteric, conjunctivae clear and moist  LUNGS:  respiratory effort normal.  ABD: Soft, nondistended and nontender with normal bowel sounds.   MSK: moves symmetrically   EXTREMITIES: soft, +2 pitting edema BLE  NEURO: Alert intermittently, cognitive impairment, Face is symmetric.  SKIN: Inspection of the skin reveals no rashes, ulcerations or petechiae.  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  He is on lisinopril which we will keep for now to help with CO.  Pain is controlled with scheduled apap, prn Lortab and scheduled morphine.  Will continue to monitor and consider getting rid of Lortab if pain continues to be controlled.  continue with sertraline and Seroquel for mood.  Nursing to turn patient every 2 hours and frequent brief changes.  Continue with Senna S and suppository for bowels.        35 total minutes spent with 20 minutes spent with daughter and patient in counseling on symptoms of end of life and progression of symptoms.  Coordinated pain management with nursing.     Electronically signed by: Netta Moore NP           Sincerely,        Netta Moore NP

## 2022-07-19 NOTE — PROGRESS NOTES
Code Status:  DNR/DNI  Visit Type: Establish Care     Facility:   [facility] () [[id number]]        History of Present Illness:   Hospital Admission Date: 7/13/2022     Hospital Discharge Date: 7/18/2022      Nic Cleary is a 89 year old male with past medical history for dementia, HTN, DM2, HLD.  He was recently hospitalized after a fall at home and was found to have a displaced acute fracture of left pubic body and left inferior pubic ramus.  Palliative care was consulted and family ultimately decided on hospice comfort care.  He was signed on to [facility] hospice and moved to LT.      Today, his daughter is at his bed side and states overall she feels his pain is well managed.  She reports that he hasn't eaten much today and he is in and out of consciousness.  He does wake to my touch however does not respond to any questions.  He is able to take his medications from nursing.  Daughter also reports apneic breathing when patient is sleeping.      History reviewed. No pertinent past medical history.  History reviewed. No pertinent surgical history.  History reviewed. No pertinent family history.  Social History     Socioeconomic History     Marital status:      Spouse name: Not on file     Number of children: Not on file     Years of education: Not on file     Highest education level: Not on file   Occupational History     Not on file   Tobacco Use     Smoking status: Never Smoker     Smokeless tobacco: Never Used   Substance and Sexual Activity     Alcohol use: Not on file     Drug use: Not on file     Sexual activity: Not on file   Other Topics Concern     Not on file   Social History Narrative     Not on file     Social Determinants of Health     Financial Resource Strain: Not on file   Food Insecurity: Not on file   Transportation Needs: Not on file   Physical Activity: Not on file   Stress: Not on file   Social Connections: Not on file   Intimate Partner Violence: Not on file   Housing  Stability: Not on file       Current Outpatient Medications   Medication Sig Dispense Refill     acetaminophen (TYLENOL) 325 MG suppository Place 650 mg rectally every 6 hours as needed for fever       acetaminophen (TYLENOL) 325 MG tablet Take 2 tablets (650 mg) by mouth every 6 hours as needed for mild pain or other (and adjunct with moderate or severe pain or per patient request)       acetaminophen (TYLENOL) 500 MG tablet Take 1,000 mg by mouth 2 times daily       aspirin 81 MG EC tablet Take 81 mg by mouth daily       bisacodyl (DULCOLAX) 10 MG suppository Place 10 mg rectally daily as needed for constipation       HYDROcodone-acetaminophen (NORCO) 5-325 MG tablet Take 1 tablet by mouth every 6 hours as needed for moderate to severe pain or breakthrough pain 15 tablet 0     hyoscyamine (LEVSIN) 0.125 MG tablet Take 0.125 mg by mouth every 4 hours as needed for cramping       latanoprost (XALATAN) 0.005 % ophthalmic solution Place 1 drop into both eyes every morning       levothyroxine (SYNTHROID/LEVOTHROID) 75 MCG tablet Take 75 mcg by mouth daily before breakfast       lisinopril (ZESTRIL) 10 MG tablet Take 10 mg by mouth At Bedtime       magnesium hydroxide (MILK OF MAGNESIA) 400 MG/5ML suspension Take 30 mLs by mouth daily as needed for constipation       melatonin 1 MG TABS tablet Take 1 tablet (1 mg) by mouth nightly as needed for sleep       morphine 5 MG solu-tab Take 2.5 mg by mouth every hour as needed for shortness of breath / dyspnea or moderate to severe pain       prochlorperazine (COMPAZINE) 10 MG tablet Take 10 mg by mouth every 6 hours as needed for nausea or vomiting       QUEtiapine (SEROQUEL) 25 MG tablet Take 2 tablets (50 mg) by mouth At Bedtime Told 7/13 that could increase to 2 tabs (50 mg) at bedtime. 7 tablet 0     QUEtiapine (SEROQUEL) 25 MG tablet Take 1 tablet (25 mg) by mouth every 6 hours as needed (Agitation) 10 tablet 0     senna-docusate (SENOKOT-S/PERICOLACE) 8.6-50 MG tablet  "Take 1 tablet by mouth 2 times daily       sertraline (ZOLOFT) 25 MG tablet Take 37.5 mg by mouth every morning       oxyCODONE (ROXICODONE) 5 MG tablet Take 0.5 tablets (2.5 mg) by mouth every 12 hours (Patient not taking: Reported on 7/19/2022) 6 tablet 0     No Known Allergies  Immunization History   Administered Date(s) Administered     COVID-19,PF,Pfizer (12+ Yrs) 01/26/2021, 02/19/2021, 10/15/2021     COVID-19,PF,Pfizer 12+ Yrs (2022 and After) 07/18/2022     FLU 6-35 months 09/24/2009, 09/20/2011     FLUAD(HD)65+ QUAD 09/27/2020     Flu 65+ Years 09/20/2019     Flu, Unspecified 09/25/2017, 09/24/2018     Influenza (High Dose) 3 valent vaccine 09/29/2016, 09/17/2021     Influenza (IIV3) PF 10/19/2005, 10/27/2006, 10/23/2007, 10/28/2008, 10/19/2010, 09/20/2012     Influenza Vaccine IM > 6 months Valent IIV4 (Alfuria,Fluzone) 09/17/2013, 09/09/2014, 09/22/2015, 09/25/2017, 09/24/2018     Influenza Vaccine, 6+MO IM (QUADRIVALENT W/PRESERVATIVES) 09/17/2013     Influenza, Quad, High Dose, Pf, 65yr+ (Fluzone HD) 09/17/2021     Pneumo Conj 13-V (2010&after) 09/29/2016, 09/29/2016     Pneumococcal 23 valent 07/22/2014, 07/22/2014, 02/03/2015, 02/03/2015     Td (Adult), Adsorbed 05/19/2003     Tdap (Adacel,Boostrix) 05/29/2015, 05/29/2015     Zoster vaccine, live 11/10/2009         Post Discharge Medication Reconciliation Status: discharge medications reconciled, continue medications without change.    Medications list and allergies in the facility chart have been reviewed.  Please see facility EMR for most up to date list.         Review of Systems   Unable to obtain due to dementia and in and out of consciousness    Physical Exam  Vital signs:/66   Pulse 84   Temp 97  F (36.1  C)   Resp 24   Ht 1.778 m (5' 10\")   Wt 75.8 kg (167 lb)   SpO2 93%   BMI 23.96 kg/m     GENERAL APPEARANCE: thin, elderly male, in no acute distress.  HEENT: normocephalic, atraumatic   sclerae anicteric, conjunctivae clear and " moist  LUNGS:  respiratory effort normal.  ABD: Soft, nondistended and nontender with normal bowel sounds.   MSK: moves symmetrically   EXTREMITIES: soft, +2 pitting edema BLE  NEURO: Alert intermittently, cognitive impairment, Face is symmetric.  SKIN: Inspection of the skin reveals no rashes, ulcerations or petechiae.  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  He is on lisinopril which we will keep for now to help with CO.  Pain is controlled with scheduled apap, prn Lortab and scheduled morphine.  Will continue to monitor and consider getting rid of Lortab if pain continues to be controlled.  continue with sertraline and Seroquel for mood.  Nursing to turn patient every 2 hours and frequent brief changes.  Continue with Senna S and suppository for bowels.        35 total minutes spent with 20 minutes spent with daughter and patient in counseling on symptoms of end of life and progression of symptoms.  Coordinated pain management with nursing.     Electronically signed by: Netta Moore NP

## 2022-07-19 NOTE — PROGRESS NOTES
[facility] GERIATRIC SERVICES       Patient Nic Cleary  MRN: [id number]        Reason for Visit     Chief Complaint   Patient presents with     Establish Care       Code Status     DNR / DNI    Assessment     Acute left inferior pubic ramus and pubic body fracture minimally displaced  History of a mechanical fall  Osteopenia  Dementia WITH AMS NOTED TODAY  Type 2 diabetes  Hypertension  Generalized weakness    Plan     Pt is admitted to LTC  She was seen in the presence of his daughter who supplemented his history  Patient admitted post fall.  Noted to have minimally displaced pubic bone fractures on the left.  Orthopedic consulted.  Nonoperative management.  Family has declined aggressive interventions.  They met with palliative care and requested comfort oriented approach.  Pain management optimized.  He is on scheduled Tylenol with Norco ordered for breakthrough pain  Also has oxycodone available twice a day  Seroquel added for management of anxiety.  At baseline has dementia and remains a poor historian no behaviors noted though  Daughter present history she feels that he had a good time did yesterday when he had a very lucid interval and could interact with family members.  At baseline he has hearing impairment and visual impairment so communication is difficult with him.  Today he is in and out of consciousness and is quite much more confused as per daughter.  Oral intake was reviewed he has had at best a few bites of food.  He is eating poorly drinking poorly.  She believes he did urinate but thinks he has not had a BM for the last few days.  She is concerned about intermittent agitation and wants him to be kept comfortable.  Overall family is excepting of his terminal diagnosis    History     Patient is a very pleasant 89 year old male who is admitted to LTC  Patient admitted post fall.  Fall felt to be mechanical.  Imaging revealed that he had left pubic body and left anterior pubic ramus fractures which  were minimally displaced.  He was also diagnosed with osteopenia.  Family did not want any aggressive measures and have switched onto hospice cares.  At baseline he has severe dementia but no behaviors.  He is on Seroquel.  Pain management optimized using Tylenol  Daughters present at bedside and supplemented all medical history as patient is in and out of consciousness.  Apparently had a lucid interval last night.      Past Medical & Surgical History     htn  A fib  dementia    Past Social History     Reviewed,  reports that he has never smoked. He has never used smokeless tobacco.    Family History     Reviewed, and includes a history of prostate cancer in his father  Mother had cancer.  2 of his brothers have coronary artery disease    Medication List   Post Discharge Medication Reconciliation Status: Post Discharge Medication Reconciliation Status: discharge medications reconciled, continue medications without change.  Current Outpatient Medications   Medication     acetaminophen (TYLENOL) 325 MG suppository     acetaminophen (TYLENOL) 325 MG tablet     acetaminophen (TYLENOL) 500 MG tablet     aspirin 81 MG EC tablet     bisacodyl (DULCOLAX) 10 MG suppository     HYDROcodone-acetaminophen (NORCO) 5-325 MG tablet     hyoscyamine (LEVSIN) 0.125 MG tablet     latanoprost (XALATAN) 0.005 % ophthalmic solution     levothyroxine (SYNTHROID/LEVOTHROID) 75 MCG tablet     lisinopril (ZESTRIL) 10 MG tablet     magnesium hydroxide (MILK OF MAGNESIA) 400 MG/5ML suspension     melatonin 1 MG TABS tablet     morphine 5 MG solu-tab     prochlorperazine (COMPAZINE) 10 MG tablet     QUEtiapine (SEROQUEL) 25 MG tablet     QUEtiapine (SEROQUEL) 25 MG tablet     senna-docusate (SENOKOT-S/PERICOLACE) 8.6-50 MG tablet     sertraline (ZOLOFT) 25 MG tablet     oxyCODONE (ROXICODONE) 5 MG tablet     No current facility-administered medications for this visit.          Allergies     No Known Allergies    Review of Systems   A  "comprehensive review of 14 systems was   Not done because of concerns about altered mental status in the setting of dementia.  At baseline patient has significant hearing impairment and wears hearing aids.  Vision is quite impaired and he does not see very daily.  Oral intake has declined as per daughter and today he just had 2-4 bites of food this is being steadily going downhill.  At best he had a few sips of water since last night.  Daughter reports that he HAS not had a BM for 2d  agitation per family and wants to be kept comfortable    Physical Exam   BP (!) 157/77   Pulse 83   Temp 96.8  F (36  C)   Resp 18   Ht 1.778 m (5' 10\")   Wt 75.8 kg (167 lb)   SpO2 95%   BMI 23.96 kg/m       Constitutional: Oriented to none  ams noted .  Patient is in and out of consciousness  Response to verbal stimuli but then goes back to sleep  HEENT:  Normocephalic and atraumatic.  Eyes: Conjunctivae and EOM are normal. Pupils are equal, round, and reactive to light. No discharge.  No scleral icterus. Nose normal. Mouth/Throat: Oropharynx is clear and moist. No oropharyngeal exudate.    Has significant hearing impairment  Has vision impairment  NECK: Normal range of motion. Neck supple. No JVD present. No tracheal deviation present. No thyromegaly present.   CARDIOVASCULAR: Normal rate, regular rhythm and intact distal pulses.  Exam reveals no gallop and no friction rub.  Systolic murmur present.  PULMONARY: Effort normal and breath sounds normal. No respiratory distress.No Wheezing or rales.  ABDOMEN: Soft. Bowel sounds are normal. No distension and no mass.  There is no tenderness. There is no rebound and no guarding. No HSM.  MUSCULOSKELETAL: Normal range of motion. No edema and no tenderness. Mild kyphosis, no tenderness.  LYMPH NODES: Has no cervical, supraclavicular, axillary and groin adenopathy.   NEUROLOGICAL: Alert and oriented to none and hard to assess as patient is nonverbal talks minimally no cranial nerve " deficit.  Normal muscle tone. Coordination normal.   GENITOURINARY: Deferred exam.  SKIN: Skin is warm and dry. No rash noted. No erythema. No pallor.   EXTREMITIES: No cyanosis, no clubbing, no edema. No Deformity.  PSYCHIATRIC: Normal mood, affect and behavior.  No meaningful history as he is quite sleepy      Lab Results     Recent Results (from the past 240 hour(s))   ECG 12-LEAD WITH MUSE ([facility])    Collection Time: 07/13/22  4:43 PM   Result Value Ref Range    Systolic Blood Pressure 179 mmHg    Diastolic Blood Pressure 93 mmHg    Ventricular Rate 85 BPM    Atrial Rate 416 BPM    GA Interval  ms    QRS Duration 98 ms     ms    QTc 456 ms    P Axis  degrees    R AXIS -39 degrees    T Axis 74 degrees    Interpretation ECG       Atrial fibrillation  Left axis deviation  Abnormal ECG  No previous ECGs available  Confirmed by SEE ED PROVIDER NOTE FOR, ECG INTERPRETATION ([id number]),  NILAY MCKEON ([id number]) on 7/13/2022 5:21:10 PM     Comprehensive metabolic panel    Collection Time: 07/13/22  5:48 PM   Result Value Ref Range    Sodium 138 136 - 145 mmol/L    Potassium 4.1 3.5 - 5.0 mmol/L    Chloride 104 98 - 107 mmol/L    Carbon Dioxide (CO2) 26 22 - 31 mmol/L    Anion Gap 8 5 - 18 mmol/L    Urea Nitrogen 17 8 - 28 mg/dL    Creatinine 0.89 0.70 - 1.30 mg/dL    Calcium 9.3 8.5 - 10.5 mg/dL    Glucose 116 70 - 125 mg/dL    Alkaline Phosphatase 57 45 - 120 U/L    AST 16 0 - 40 U/L    ALT <9 0 - 45 U/L    Protein Total 6.6 6.0 - 8.0 g/dL    Albumin 3.9 3.5 - 5.0 g/dL    Bilirubin Total 0.8 0.0 - 1.0 mg/dL    GFR Estimate 81 >60 mL/min/1.73m2   Troponin I    Collection Time: 07/13/22  5:48 PM   Result Value Ref Range    Troponin I 0.02 0.00 - 0.29 ng/mL   Magnesium    Collection Time: 07/13/22  5:48 PM   Result Value Ref Range    Magnesium 1.9 1.8 - 2.6 mg/dL   CK total    Collection Time: 07/13/22  5:48 PM   Result Value Ref Range     30 - 190 U/L   CBC with platelets and differential    Collection Time:  07/13/22  5:48 PM   Result Value Ref Range    WBC Count 11.1 (H) 4.0 - 11.0 10e3/uL    RBC Count 3.88 (L) 4.40 - 5.90 10e6/uL    Hemoglobin 11.7 (L) 13.3 - 17.7 g/dL    Hematocrit 35.5 (L) 40.0 - 53.0 %    MCV 92 78 - 100 fL    MCH 30.2 26.5 - 33.0 pg    MCHC 33.0 31.5 - 36.5 g/dL    RDW 13.1 10.0 - 15.0 %    Platelet Count 154 150 - 450 10e3/uL    % Neutrophils 78 %    % Lymphocytes 12 %    % Monocytes 7 %    % Eosinophils 2 %    % Basophils 0 %    % Immature Granulocytes 1 %    NRBCs per 100 WBC 0 <1 /100    Absolute Neutrophils 8.7 (H) 1.6 - 8.3 10e3/uL    Absolute Lymphocytes 1.4 0.8 - 5.3 10e3/uL    Absolute Monocytes 0.7 0.0 - 1.3 10e3/uL    Absolute Eosinophils 0.2 0.0 - 0.7 10e3/uL    Absolute Basophils 0.0 0.0 - 0.2 10e3/uL    Absolute Immature Granulocytes 0.1 <=0.4 10e3/uL    Absolute NRBCs 0.0 10e3/uL   Asymptomatic COVID-19 Virus (Coronavirus) by PCR Nasopharyngeal    Collection Time: 07/13/22 10:47 PM    Specimen: Nasopharyngeal; Swab   Result Value Ref Range    SARS CoV2 PCR Negative Negative   UA with Microscopic reflex to Culture    Collection Time: 07/14/22  1:24 AM    Specimen: Urine, Catheter   Result Value Ref Range    Color Urine Yellow Colorless, Straw, Light Yellow, Yellow    Appearance Urine Clear Clear    Glucose Urine Negative Negative mg/dL    Bilirubin Urine Negative Negative    Ketones Urine Negative Negative mg/dL    Specific Gravity Urine 1.018 1.001 - 1.030    Blood Urine 0.2 mg/dL (A) Negative    pH Urine 5.5 5.0 - 7.0    Protein Albumin Urine 10  (A) Negative mg/dL    Urobilinogen Urine <2.0 <2.0 mg/dL    Nitrite Urine Negative Negative    Leukocyte Esterase Urine Negative Negative    Mucus Urine Present (A) None Seen /LPF    RBC Urine 23 (H) <=2 /HPF    WBC Urine 4 <=5 /HPF    Squamous Epithelials Urine <1 <=1 /HPF   Comprehensive metabolic panel    Collection Time: 07/14/22  8:06 AM   Result Value Ref Range    Sodium 137 136 - 145 mmol/L    Potassium 4.0 3.5 - 5.0 mmol/L     Chloride 103 98 - 107 mmol/L    Carbon Dioxide (CO2) 28 22 - 31 mmol/L    Anion Gap 6 5 - 18 mmol/L    Urea Nitrogen 17 8 - 28 mg/dL    Creatinine 0.81 0.70 - 1.30 mg/dL    Calcium 8.9 8.5 - 10.5 mg/dL    Glucose 114 70 - 125 mg/dL    Alkaline Phosphatase 43 (L) 45 - 120 U/L    AST 16 0 - 40 U/L    ALT 10 0 - 45 U/L    Protein Total 6.2 6.0 - 8.0 g/dL    Albumin 3.6 3.5 - 5.0 g/dL    Bilirubin Total 1.4 (H) 0.0 - 1.0 mg/dL    GFR Estimate 83 >60 mL/min/1.73m2   CBC with platelets and differential    Collection Time: 07/14/22  8:06 AM   Result Value Ref Range    WBC Count 8.1 4.0 - 11.0 10e3/uL    RBC Count 3.50 (L) 4.40 - 5.90 10e6/uL    Hemoglobin 10.6 (L) 13.3 - 17.7 g/dL    Hematocrit 31.9 (L) 40.0 - 53.0 %    MCV 91 78 - 100 fL    MCH 30.3 26.5 - 33.0 pg    MCHC 33.2 31.5 - 36.5 g/dL    RDW 13.2 10.0 - 15.0 %    Platelet Count 141 (L) 150 - 450 10e3/uL    % Neutrophils 68 %    % Lymphocytes 18 %    % Monocytes 9 %    % Eosinophils 4 %    % Basophils 1 %    % Immature Granulocytes 0 %    NRBCs per 100 WBC 0 <1 /100    Absolute Neutrophils 5.5 1.6 - 8.3 10e3/uL    Absolute Lymphocytes 1.5 0.8 - 5.3 10e3/uL    Absolute Monocytes 0.8 0.0 - 1.3 10e3/uL    Absolute Eosinophils 0.4 0.0 - 0.7 10e3/uL    Absolute Basophils 0.0 0.0 - 0.2 10e3/uL    Absolute Immature Granulocytes 0.0 <=0.4 10e3/uL    Absolute NRBCs 0.0 10e3/uL   COVID-19 Virus (Coronavirus) by PCR Nasopharyngeal    Collection Time: 07/19/22 12:40 PM    Specimen: Nasopharyngeal; Swab   Result Value Ref Range    SARS CoV2 PCR Negative Negative, Testing sent to reference lab. Results will be returned via unsolicited result             Imaging Results     XR Ankle Left G/E 3 Views    Result Date: 7/13/2022  EXAM: XR ANKLE LEFT G/E 3 VIEWS LOCATION: [facility] DATE/TIME: 7/13/2022 6:19 PM INDICATION: fall pain COMPARISON: None.     IMPRESSION: Osteopenia. No fractures are identified. The ankle mortise is intact. The talar dome is  unremarkable.    XR Femur Left 2 Views    Result Date: 7/13/2022  EXAM: XR FEMUR LEFT 2 VIEW LOCATION: [facility] DATE/TIME: 7/13/2022 6:19 PM INDICATION: Pain after fall. COMPARISON: None.     IMPRESSION: Anatomic alignment left femur. No acute displaced left femur fracture is identified. Largely preserved hip and knee joint spaces for age. No left knee joint effusion. Arterial calcification. Diffuse bone demineralization.    Humerus XR,  G/E 2 views, left    Result Date: 7/13/2022  EXAM: XR HUMERUS LEFT G/E 2 VIEWS LOCATION: [facility] DATE/TIME: 7/13/2022 6:19 PM INDICATION: fall, pain COMPARISON: None.     IMPRESSION: Osteopenia. No fractures are identified. Degenerative changes in the glenohumeral and acromioclavicular joints.    XR Pelvis 1/2 Views    Result Date: 7/13/2022  EXAM: XR PELVIS 1/2 VW LOCATION: [facility] DATE/TIME: 7/13/2022 6:19 PM INDICATION: Fall. Pelvic pain. COMPARISON: None.     IMPRESSION: No acute displaced pelvic or proximal femur fracture identified on this single AP image. Largely preserved hip joint spaces for age. Both obturator rings appear intact. Degenerative change lower lumbar spine. Symmetric SI joints.    Ankle XR, G/E 3 views, right    Result Date: 7/13/2022  EXAM: XR ANKLE RIGHT G/E 3 VIEWS LOCATION: [facility] DATE/TIME: 7/13/2022 6:19 PM INDICATION: fall, pain COMPARISON: None.     IMPRESSION: Osteopenia. No fractures are evident. The ankle mortise is intact. The talar dome is unremarkable.    CT Abdomen Pelvis w/o Contrast    Result Date: 7/13/2022  EXAM: CT ABDOMEN AND PELVIS WITHOUT CONTRAST LOCATION: [facility] DATE/TIME: 7/13/2022 9:13 PM INDICATION: Fall. Lower pelvic pain. COMPARISON: None. TECHNIQUE: CT scan of the abdomen and pelvis was performed without IV contrast. Multiplanar reformats were obtained. Dose reduction techniques were  used. CONTRAST: None. FINDINGS: LOWER CHEST: Coronary artery calcification. HEPATOBILIARY: Unremarkable. SPLEEN: Unremarkable. PANCREAS: Mild diffuse atrophy. ADRENAL GLANDS: Unremarkable. KIDNEYS/BLADDER: Mild to moderate multifocal left renal atrophy. No suspicious renal lesions. 1.1 cm diverticulum from the posterior right aspect of the urinary bladder. BOWEL: The appendix is not definitely identified. LYMPH NODES: Unremarkable. PELVIC ORGANS: No acute findings. MUSCULOSKELETAL: Minimally displaced acute fracture of the left pubic body. Nondisplaced acute fracture of the left inferior pubic ramus. A small amount of high attenuation stranding within the fat about the region of the fractures and within the anterior aspect of the lower pelvis, likely in the retroperitoneal space, consistent with associated acute blood products. OTHER: Atherosclerotic calcification in the abdominal aorta.     IMPRESSION: 1. Minimally displaced acute fracture of the left pubic body and nondisplaced acute fracture of the left inferior pubic ramus. 2. A small amount of associated blood products are present within the soft tissues in the regions of the fractures and in the anterior lower pelvis. 3. No other acute abnormality identified in the abdomen or pelvis.    Cervical spine CT w/o contrast    Result Date: 7/13/2022  EXAM: CT CERVICAL SPINE W/O CONTRAST LOCATION: [facility] DATE/TIME: 7/13/2022 6:20 PM INDICATION: Fall, pain. COMPARISON: None. TECHNIQUE: Routine CT Cervical Spine without IV contrast. Multiplanar reformats. Dose reduction techniques were used. FINDINGS: VERTEBRA: Normal vertebral body heights and alignment. There are multiple tiny circular appearing lucencies throughout the vertebral bodies which may reflect advanced demineralization/osteopenia change but should be correlated for any clinical history of  malignancy. Mild superior endplate deformity of T4 may be chronic as there is no distinct  fracture line with minimal loss of vertebral body height. Similar findings superior endplate T3. Similar findings involve the T1 vertebral body with a buckle of the anterior cortex, but without distinct fracture lucency or adjacent prevertebral or paravertebral abnormality to indicate that this is acute. Other vertebral bodies are preserved in height. There is trace 2 mm anterolisthesis C7 on T1 and 2 mm anterolisthesis T2 on T3. Otherwise there is anatomic sagittal alignment. No convincing evidence of acute fracture, however. No evidence of posttraumatic subluxation. CANAL/FORAMINA: No high-grade central canal stenosis. There are variable mild to moderate degrees of foraminal narrowing. PARASPINAL: Right canal wall down mastoidectomy. Thyroid gland is small in size.     IMPRESSION: 1.  No convincing evidence of acute fracture. However, there are multiple levels of mild superior endplate contour deformities which may be chronic given the lack of a distinct fracture lucency or significant paraspinous abnormality. Correlate clinically. If there is further clinical concern, MRI could be performed to evaluate for marrow edema.    Head CT w/o contrast    Result Date: 7/13/2022  EXAM: CT HEAD W/O CONTRAST LOCATION: [facility] DATE/TIME: 7/13/2022 6:20 PM INDICATION: Fall, pain. COMPARISON: None. TECHNIQUE: Routine CT Head without IV contrast. Multiplanar reformats. Dose reduction techniques were used. FINDINGS: INTRACRANIAL CONTENTS: There is dural based mineralization along the anterosuperior aspect of the right middle cranial fossa, image 20 series 2. There is also some dystrophic appearing gyriform type calcification along the paramedian left occipital lobe,  likely related to prior infectious or inflammatory insult. 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 There is amorphous low-attenuation within the anterior aspect of the right thalamus which may reflect lacunar infarct,  age-indeterminate. No intracranial hemorrhage, extraaxial collection, or mass effect.  No CT evidence of acute infarct. Mild presumed chronic small vessel ischemic changes. Mild generalized volume loss. No hydrocephalus. VISUALIZED ORBITS/SINUSES/MASTOIDS: Prior bilateral cataract surgery. Visualized portions of the orbits are otherwise unremarkable. Right maxillary mucous retention cyst versus polyp. Right canal wall down mastoidectomy. There is some resultant thinning of the right sigmoid plate. No left mastoid or middle ear effusion. The right middle ear ossicles are poorly visualized. BONES/SOFT TISSUES: No acute abnormality.     IMPRESSION: 1.  Amorphous low-attenuation anterior aspect right thalamus may reflect lacunar infarct, age-indeterminate. 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 2.  No other potential evidence of acute intracranial process with chronic appearing brain parenchymal changes as above.          Electronically signed by    Johanny Casas MD

## 2022-07-19 NOTE — PLAN OF CARE
Patient ready for discharge. Discharge instructions discussed and questions answered. All belongings sent with patient.   Maximo Flynn RN

## 2022-07-20 PROBLEM — S06.5X1D: Status: ACTIVE | Noted: 2022-01-01

## 2022-07-20 NOTE — LETTER
7/20/2022        RE: Nic Cleary  [address] [facility] [address]        [facility]       Patient Nic Cleary  MRN: [id number]        Reason for Visit     Chief Complaint   Patient presents with     Establish Care       Code Status     DNR / DNI    Assessment     Acute left inferior pubic ramus and pubic body fracture minimally displaced  History of a mechanical fall  Osteopenia  Dementia WITH AMS NOTED TODAY  Type 2 diabetes  Hypertension  Generalized weakness    Plan     Pt is admitted to LTC  She was seen in the presence of his daughter who supplemented his history  Patient admitted post fall.  Noted to have minimally displaced pubic bone fractures on the left.  Orthopedic consulted.  Nonoperative management.  Family has declined aggressive interventions.  They met with palliative care and requested comfort oriented approach.  Pain management optimized.  He is on scheduled Tylenol with Norco ordered for breakthrough pain  Also has oxycodone available twice a day  Seroquel added for management of anxiety.  At baseline has dementia and remains a poor historian no behaviors noted though  Daughter present history she feels that he had a good time did yesterday when he had a very lucid interval and could interact with family members.  At baseline he has hearing impairment and visual impairment so communication is difficult with him.  Today he is in and out of consciousness and is quite much more confused as per daughter.  Oral intake was reviewed he has had at best a few bites of food.  He is eating poorly drinking poorly.  She believes he did urinate but thinks he has not had a BM for the last few days.  She is concerned about intermittent agitation and wants him to be kept comfortable.  Overall family is excepting of his terminal diagnosis    History     Patient is a very pleasant 89 year old male who is admitted to LTC  Patient admitted post fall.  Fall felt to be mechanical.  Imaging  revealed that he had left pubic body and left anterior pubic ramus fractures which were minimally displaced.  He was also diagnosed with osteopenia.  Family did not want any aggressive measures and have switched onto hospice cares.  At baseline he has severe dementia but no behaviors.  He is on Seroquel.  Pain management optimized using Tylenol  Daughters present at bedside and supplemented all medical history as patient is in and out of consciousness.  Apparently had a lucid interval last night.      Past Medical & Surgical History     htn  A fib  dementia    Past Social History     Reviewed,  reports that he has never smoked. He has never used smokeless tobacco.    Family History     Reviewed, and includes a history of prostate cancer in his father  Mother had cancer.  2 of his brothers have coronary artery disease    Medication List   Post Discharge Medication Reconciliation Status: Post Discharge Medication Reconciliation Status: discharge medications reconciled, continue medications without change.  Current Outpatient Medications   Medication     acetaminophen (TYLENOL) 325 MG suppository     acetaminophen (TYLENOL) 325 MG tablet     acetaminophen (TYLENOL) 500 MG tablet     aspirin 81 MG EC tablet     bisacodyl (DULCOLAX) 10 MG suppository     HYDROcodone-acetaminophen (NORCO) 5-325 MG tablet     hyoscyamine (LEVSIN) 0.125 MG tablet     latanoprost (XALATAN) 0.005 % ophthalmic solution     levothyroxine (SYNTHROID/LEVOTHROID) 75 MCG tablet     lisinopril (ZESTRIL) 10 MG tablet     magnesium hydroxide (MILK OF MAGNESIA) 400 MG/5ML suspension     melatonin 1 MG TABS tablet     morphine 5 MG solu-tab     prochlorperazine (COMPAZINE) 10 MG tablet     QUEtiapine (SEROQUEL) 25 MG tablet     QUEtiapine (SEROQUEL) 25 MG tablet     senna-docusate (SENOKOT-S/PERICOLACE) 8.6-50 MG tablet     sertraline (ZOLOFT) 25 MG tablet     oxyCODONE (ROXICODONE) 5 MG tablet     No current facility-administered medications for this  "visit.          Allergies     No Known Allergies    Review of Systems   A comprehensive review of 14 systems was   Not done because of concerns about altered mental status in the setting of dementia.  At baseline patient has significant hearing impairment and wears hearing aids.  Vision is quite impaired and he does not see very daily.  Oral intake has declined as per daughter and today he just had 2-4 bites of food this is being steadily going downhill.  At best he had a few sips of water since last night.  Daughter reports that he HAS not had a BM for 2d  agitation per family and wants to be kept comfortable    Physical Exam   BP (!) 157/77   Pulse 83   Temp 96.8  F (36  C)   Resp 18   Ht 1.778 m (5' 10\")   Wt 75.8 kg (167 lb)   SpO2 95%   BMI 23.96 kg/m       Constitutional: Oriented to none  ams noted .  Patient is in and out of consciousness  Response to verbal stimuli but then goes back to sleep  HEENT:  Normocephalic and atraumatic.  Eyes: Conjunctivae and EOM are normal. Pupils are equal, round, and reactive to light. No discharge.  No scleral icterus. Nose normal. Mouth/Throat: Oropharynx is clear and moist. No oropharyngeal exudate.    Has significant hearing impairment  Has vision impairment  NECK: Normal range of motion. Neck supple. No JVD present. No tracheal deviation present. No thyromegaly present.   CARDIOVASCULAR: Normal rate, regular rhythm and intact distal pulses.  Exam reveals no gallop and no friction rub.  Systolic murmur present.  PULMONARY: Effort normal and breath sounds normal. No respiratory distress.No Wheezing or rales.  ABDOMEN: Soft. Bowel sounds are normal. No distension and no mass.  There is no tenderness. There is no rebound and no guarding. No HSM.  MUSCULOSKELETAL: Normal range of motion. No edema and no tenderness. Mild kyphosis, no tenderness.  LYMPH NODES: Has no cervical, supraclavicular, axillary and groin adenopathy.   NEUROLOGICAL: Alert and oriented to none and " hard to assess as patient is nonverbal talks minimally no cranial nerve deficit.  Normal muscle tone. Coordination normal.   GENITOURINARY: Deferred exam.  SKIN: Skin is warm and dry. No rash noted. No erythema. No pallor.   EXTREMITIES: No cyanosis, no clubbing, no edema. No Deformity.  PSYCHIATRIC: Normal mood, affect and behavior.  No meaningful history as he is quite sleepy      Lab Results     Recent Results (from the past 240 hour(s))   ECG 12-LEAD WITH MUSE ([facility])    Collection Time: 07/13/22  4:43 PM   Result Value Ref Range    Systolic Blood Pressure 179 mmHg    Diastolic Blood Pressure 93 mmHg    Ventricular Rate 85 BPM    Atrial Rate 416 BPM    GA Interval  ms    QRS Duration 98 ms     ms    QTc 456 ms    P Axis  degrees    R AXIS -39 degrees    T Axis 74 degrees    Interpretation ECG       Atrial fibrillation  Left axis deviation  Abnormal ECG  No previous ECGs available  Confirmed by SEE ED PROVIDER NOTE FOR, ECG INTERPRETATION ([id number]),  NILAY MCKEON ([id number]) on 7/13/2022 5:21:10 PM     Comprehensive metabolic panel    Collection Time: 07/13/22  5:48 PM   Result Value Ref Range    Sodium 138 136 - 145 mmol/L    Potassium 4.1 3.5 - 5.0 mmol/L    Chloride 104 98 - 107 mmol/L    Carbon Dioxide (CO2) 26 22 - 31 mmol/L    Anion Gap 8 5 - 18 mmol/L    Urea Nitrogen 17 8 - 28 mg/dL    Creatinine 0.89 0.70 - 1.30 mg/dL    Calcium 9.3 8.5 - 10.5 mg/dL    Glucose 116 70 - 125 mg/dL    Alkaline Phosphatase 57 45 - 120 U/L    AST 16 0 - 40 U/L    ALT <9 0 - 45 U/L    Protein Total 6.6 6.0 - 8.0 g/dL    Albumin 3.9 3.5 - 5.0 g/dL    Bilirubin Total 0.8 0.0 - 1.0 mg/dL    GFR Estimate 81 >60 mL/min/1.73m2   Troponin I    Collection Time: 07/13/22  5:48 PM   Result Value Ref Range    Troponin I 0.02 0.00 - 0.29 ng/mL   Magnesium    Collection Time: 07/13/22  5:48 PM   Result Value Ref Range    Magnesium 1.9 1.8 - 2.6 mg/dL   CK total    Collection Time: 07/13/22  5:48 PM   Result Value Ref Range      30 - 190 U/L   CBC with platelets and differential    Collection Time: 07/13/22  5:48 PM   Result Value Ref Range    WBC Count 11.1 (H) 4.0 - 11.0 10e3/uL    RBC Count 3.88 (L) 4.40 - 5.90 10e6/uL    Hemoglobin 11.7 (L) 13.3 - 17.7 g/dL    Hematocrit 35.5 (L) 40.0 - 53.0 %    MCV 92 78 - 100 fL    MCH 30.2 26.5 - 33.0 pg    MCHC 33.0 31.5 - 36.5 g/dL    RDW 13.1 10.0 - 15.0 %    Platelet Count 154 150 - 450 10e3/uL    % Neutrophils 78 %    % Lymphocytes 12 %    % Monocytes 7 %    % Eosinophils 2 %    % Basophils 0 %    % Immature Granulocytes 1 %    NRBCs per 100 WBC 0 <1 /100    Absolute Neutrophils 8.7 (H) 1.6 - 8.3 10e3/uL    Absolute Lymphocytes 1.4 0.8 - 5.3 10e3/uL    Absolute Monocytes 0.7 0.0 - 1.3 10e3/uL    Absolute Eosinophils 0.2 0.0 - 0.7 10e3/uL    Absolute Basophils 0.0 0.0 - 0.2 10e3/uL    Absolute Immature Granulocytes 0.1 <=0.4 10e3/uL    Absolute NRBCs 0.0 10e3/uL   Asymptomatic COVID-19 Virus (Coronavirus) by PCR Nasopharyngeal    Collection Time: 07/13/22 10:47 PM    Specimen: Nasopharyngeal; Swab   Result Value Ref Range    SARS CoV2 PCR Negative Negative   UA with Microscopic reflex to Culture    Collection Time: 07/14/22  1:24 AM    Specimen: Urine, Catheter   Result Value Ref Range    Color Urine Yellow Colorless, Straw, Light Yellow, Yellow    Appearance Urine Clear Clear    Glucose Urine Negative Negative mg/dL    Bilirubin Urine Negative Negative    Ketones Urine Negative Negative mg/dL    Specific Gravity Urine 1.018 1.001 - 1.030    Blood Urine 0.2 mg/dL (A) Negative    pH Urine 5.5 5.0 - 7.0    Protein Albumin Urine 10  (A) Negative mg/dL    Urobilinogen Urine <2.0 <2.0 mg/dL    Nitrite Urine Negative Negative    Leukocyte Esterase Urine Negative Negative    Mucus Urine Present (A) None Seen /LPF    RBC Urine 23 (H) <=2 /HPF    WBC Urine 4 <=5 /HPF    Squamous Epithelials Urine <1 <=1 /HPF   Comprehensive metabolic panel    Collection Time: 07/14/22  8:06 AM   Result Value Ref Range     Sodium 137 136 - 145 mmol/L    Potassium 4.0 3.5 - 5.0 mmol/L    Chloride 103 98 - 107 mmol/L    Carbon Dioxide (CO2) 28 22 - 31 mmol/L    Anion Gap 6 5 - 18 mmol/L    Urea Nitrogen 17 8 - 28 mg/dL    Creatinine 0.81 0.70 - 1.30 mg/dL    Calcium 8.9 8.5 - 10.5 mg/dL    Glucose 114 70 - 125 mg/dL    Alkaline Phosphatase 43 (L) 45 - 120 U/L    AST 16 0 - 40 U/L    ALT 10 0 - 45 U/L    Protein Total 6.2 6.0 - 8.0 g/dL    Albumin 3.6 3.5 - 5.0 g/dL    Bilirubin Total 1.4 (H) 0.0 - 1.0 mg/dL    GFR Estimate 83 >60 mL/min/1.73m2   CBC with platelets and differential    Collection Time: 07/14/22  8:06 AM   Result Value Ref Range    WBC Count 8.1 4.0 - 11.0 10e3/uL    RBC Count 3.50 (L) 4.40 - 5.90 10e6/uL    Hemoglobin 10.6 (L) 13.3 - 17.7 g/dL    Hematocrit 31.9 (L) 40.0 - 53.0 %    MCV 91 78 - 100 fL    MCH 30.3 26.5 - 33.0 pg    MCHC 33.2 31.5 - 36.5 g/dL    RDW 13.2 10.0 - 15.0 %    Platelet Count 141 (L) 150 - 450 10e3/uL    % Neutrophils 68 %    % Lymphocytes 18 %    % Monocytes 9 %    % Eosinophils 4 %    % Basophils 1 %    % Immature Granulocytes 0 %    NRBCs per 100 WBC 0 <1 /100    Absolute Neutrophils 5.5 1.6 - 8.3 10e3/uL    Absolute Lymphocytes 1.5 0.8 - 5.3 10e3/uL    Absolute Monocytes 0.8 0.0 - 1.3 10e3/uL    Absolute Eosinophils 0.4 0.0 - 0.7 10e3/uL    Absolute Basophils 0.0 0.0 - 0.2 10e3/uL    Absolute Immature Granulocytes 0.0 <=0.4 10e3/uL    Absolute NRBCs 0.0 10e3/uL   COVID-19 Virus (Coronavirus) by PCR Nasopharyngeal    Collection Time: 07/19/22 12:40 PM    Specimen: Nasopharyngeal; Swab   Result Value Ref Range    SARS CoV2 PCR Negative Negative, Testing sent to reference lab. Results will be returned via unsolicited result             Imaging Results     XR Ankle Left G/E 3 Views    Result Date: 7/13/2022  EXAM: XR ANKLE LEFT G/E 3 VIEWS LOCATION: [facility] DATE/TIME: 7/13/2022 6:19 PM INDICATION: fall pain COMPARISON: None.     IMPRESSION: Osteopenia. No fractures are  identified. The ankle mortise is intact. The talar dome is unremarkable.    XR Femur Left 2 Views    Result Date: 7/13/2022  EXAM: XR FEMUR LEFT 2 VIEW LOCATION: [facility] DATE/TIME: 7/13/2022 6:19 PM INDICATION: Pain after fall. COMPARISON: None.     IMPRESSION: Anatomic alignment left femur. No acute displaced left femur fracture is identified. Largely preserved hip and knee joint spaces for age. No left knee joint effusion. Arterial calcification. Diffuse bone demineralization.    Humerus XR,  G/E 2 views, left    Result Date: 7/13/2022  EXAM: XR HUMERUS LEFT G/E 2 VIEWS LOCATION: [facility] DATE/TIME: 7/13/2022 6:19 PM INDICATION: fall, pain COMPARISON: None.     IMPRESSION: Osteopenia. No fractures are identified. Degenerative changes in the glenohumeral and acromioclavicular joints.    XR Pelvis 1/2 Views    Result Date: 7/13/2022  EXAM: XR PELVIS 1/2 VW LOCATION: [facility] DATE/TIME: 7/13/2022 6:19 PM INDICATION: Fall. Pelvic pain. COMPARISON: None.     IMPRESSION: No acute displaced pelvic or proximal femur fracture identified on this single AP image. Largely preserved hip joint spaces for age. Both obturator rings appear intact. Degenerative change lower lumbar spine. Symmetric SI joints.    Ankle XR, G/E 3 views, right    Result Date: 7/13/2022  EXAM: XR ANKLE RIGHT G/E 3 VIEWS LOCATION: [facility] DATE/TIME: 7/13/2022 6:19 PM INDICATION: fall, pain COMPARISON: None.     IMPRESSION: Osteopenia. No fractures are evident. The ankle mortise is intact. The talar dome is unremarkable.    CT Abdomen Pelvis w/o Contrast    Result Date: 7/13/2022  EXAM: CT ABDOMEN AND PELVIS WITHOUT CONTRAST LOCATION: [facility] DATE/TIME: 7/13/2022 9:13 PM INDICATION: Fall. Lower pelvic pain. COMPARISON: None. TECHNIQUE: CT scan of the abdomen and pelvis was performed without IV contrast. Multiplanar  reformats were obtained. Dose reduction techniques were used. CONTRAST: None. FINDINGS: LOWER CHEST: Coronary artery calcification. HEPATOBILIARY: Unremarkable. SPLEEN: Unremarkable. PANCREAS: Mild diffuse atrophy. ADRENAL GLANDS: Unremarkable. KIDNEYS/BLADDER: Mild to moderate multifocal left renal atrophy. No suspicious renal lesions. 1.1 cm diverticulum from the posterior right aspect of the urinary bladder. BOWEL: The appendix is not definitely identified. LYMPH NODES: Unremarkable. PELVIC ORGANS: No acute findings. MUSCULOSKELETAL: Minimally displaced acute fracture of the left pubic body. Nondisplaced acute fracture of the left inferior pubic ramus. A small amount of high attenuation stranding within the fat about the region of the fractures and within the anterior aspect of the lower pelvis, likely in the retroperitoneal space, consistent with associated acute blood products. OTHER: Atherosclerotic calcification in the abdominal aorta.     IMPRESSION: 1. Minimally displaced acute fracture of the left pubic body and nondisplaced acute fracture of the left inferior pubic ramus. 2. A small amount of associated blood products are present within the soft tissues in the regions of the fractures and in the anterior lower pelvis. 3. No other acute abnormality identified in the abdomen or pelvis.    Cervical spine CT w/o contrast    Result Date: 7/13/2022  EXAM: CT CERVICAL SPINE W/O CONTRAST LOCATION: [facility] DATE/TIME: 7/13/2022 6:20 PM INDICATION: Fall, pain. COMPARISON: None. TECHNIQUE: Routine CT Cervical Spine without IV contrast. Multiplanar reformats. Dose reduction techniques were used. FINDINGS: VERTEBRA: Normal vertebral body heights and alignment. There are multiple tiny circular appearing lucencies throughout the vertebral bodies which may reflect advanced demineralization/osteopenia change but should be correlated for any clinical history of  malignancy. Mild superior  endplate deformity of T4 may be chronic as there is no distinct fracture line with minimal loss of vertebral body height. Similar findings superior endplate T3. Similar findings involve the T1 vertebral body with a buckle of the anterior cortex, but without distinct fracture lucency or adjacent prevertebral or paravertebral abnormality to indicate that this is acute. Other vertebral bodies are preserved in height. There is trace 2 mm anterolisthesis C7 on T1 and 2 mm anterolisthesis T2 on T3. Otherwise there is anatomic sagittal alignment. No convincing evidence of acute fracture, however. No evidence of posttraumatic subluxation. CANAL/FORAMINA: No high-grade central canal stenosis. There are variable mild to moderate degrees of foraminal narrowing. PARASPINAL: Right canal wall down mastoidectomy. Thyroid gland is small in size.     IMPRESSION: 1.  No convincing evidence of acute fracture. However, there are multiple levels of mild superior endplate contour deformities which may be chronic given the lack of a distinct fracture lucency or significant paraspinous abnormality. Correlate clinically. If there is further clinical concern, MRI could be performed to evaluate for marrow edema.    Head CT w/o contrast    Result Date: 7/13/2022  EXAM: CT HEAD W/O CONTRAST LOCATION: [facility] DATE/TIME: 7/13/2022 6:20 PM INDICATION: Fall, pain. COMPARISON: None. TECHNIQUE: Routine CT Head without IV contrast. Multiplanar reformats. Dose reduction techniques were used. FINDINGS: INTRACRANIAL CONTENTS: There is dural based mineralization along the anterosuperior aspect of the right middle cranial fossa, image 20 series 2. There is also some dystrophic appearing gyriform type calcification along the paramedian left occipital lobe,  likely related to prior infectious or inflammatory insult. 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 There is amorphous low-attenuation within the anterior aspect of the  right thalamus which may reflect lacunar infarct, age-indeterminate. No intracranial hemorrhage, extraaxial collection, or mass effect.  No CT evidence of acute infarct. Mild presumed chronic small vessel ischemic changes. Mild generalized volume loss. No hydrocephalus. VISUALIZED ORBITS/SINUSES/MASTOIDS: Prior bilateral cataract surgery. Visualized portions of the orbits are otherwise unremarkable. Right maxillary mucous retention cyst versus polyp. Right canal wall down mastoidectomy. There is some resultant thinning of the right sigmoid plate. No left mastoid or middle ear effusion. The right middle ear ossicles are poorly visualized. BONES/SOFT TISSUES: No acute abnormality.     IMPRESSION: 1.  Amorphous low-attenuation anterior aspect right thalamus may reflect lacunar infarct, age-indeterminate. 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 2.  No other potential evidence of acute intracranial process with chronic appearing brain parenchymal changes as above.          Electronically signed by    Johanny Casas MD                             Sincerely,        CARMEN Angel

## 2022-08-03 PROBLEM — S06.5XAA: Status: ACTIVE | Noted: 2019-01-01

## 2022-08-03 PROBLEM — R11.0 NAUSEA: Status: ACTIVE | Noted: 2022-01-01

## 2022-08-03 PROBLEM — F32.A DEPRESSIVE DISORDER: Status: ACTIVE | Noted: 2022-01-01

## 2022-08-03 PROBLEM — F03.90 DEMENTIA (H): Status: ACTIVE | Noted: 2022-01-01

## 2022-08-03 PROBLEM — M85.80 OSTEOPENIA: Status: ACTIVE | Noted: 2022-01-01

## 2022-08-03 PROBLEM — F41.9 ANXIETY: Status: ACTIVE | Noted: 2022-01-01

## 2022-08-03 PROBLEM — G30.1 LATE ONSET ALZHEIMER'S DISEASE WITHOUT BEHAVIORAL DISTURBANCE (H): Status: ACTIVE | Noted: 2019-01-04

## 2022-08-03 PROBLEM — S32.9XXA FRACTURE OF PELVIS (H): Status: ACTIVE | Noted: 2022-01-01

## 2022-08-03 PROBLEM — K21.9 GASTROESOPHAGEAL REFLUX DISEASE: Status: ACTIVE | Noted: 2022-01-01

## 2022-08-03 PROBLEM — F02.80 LATE ONSET ALZHEIMER'S DISEASE WITHOUT BEHAVIORAL DISTURBANCE (H): Status: ACTIVE | Noted: 2019-01-04

## 2022-08-03 PROBLEM — G62.9 PERIPHERAL POLYNEUROPATHY: Status: ACTIVE | Noted: 2017-03-28

## 2022-08-03 PROBLEM — E55.9 VITAMIN D DEFICIENCY: Status: ACTIVE | Noted: 2022-01-01

## 2022-08-03 NOTE — PROGRESS NOTES
Code Status:  DNR/DNI  Visit Type: Nursing Home Regulatory     Facility:   [facility] () [[id number]]        History of Present Illness:   Hospital Admission Date: 7/13/2022     Hospital Discharge Date: 7/18/2022      Nic Cleary is a 89 year old male with past medical history for dementia, HTN, DM2, HLD.  He was recently hospitalized after a fall at home and was found to have a displaced acute fracture of left pubic body and left inferior pubic ramus.  Palliative care was consulted and family ultimately decided on hospice comfort care.  He was signed on to [facility] hospice and moved to LT.      Today, nursing requests a visit due to declining status.  Patient is on [facility] hospice and is unresponsive today.  He does have apneic breathing and gurgling tracheal sounds.            Current Outpatient Medications   Medication Sig Dispense Refill     acetaminophen (TYLENOL) 325 MG suppository Place 650 mg rectally every 6 hours as needed for fever       bisacodyl (DULCOLAX) 10 MG suppository Place 10 mg rectally daily as needed for constipation       hyoscyamine (LEVSIN) 0.125 MG tablet Take 0.125 mg by mouth every 4 hours as needed for cramping       magnesium hydroxide (MILK OF MAGNESIA) 400 MG/5ML suspension Take 30 mLs by mouth daily as needed for constipation       morphine 5 MG solu-tab Take 2.5 mg by mouth every hour as needed for shortness of breath / dyspnea or moderate to severe pain       prochlorperazine (COMPAZINE) 10 MG tablet Take 10 mg by mouth every 6 hours as needed for nausea or vomiting       No Known Allergies  Immunization History   Administered Date(s) Administered     COVID-19,PF,Pfizer (12+ Yrs) 01/26/2021, 02/19/2021, 10/15/2021     COVID-19,PF,Pfizer 12+ Yrs (2022 and After) 07/18/2022     FLU 6-35 months 09/24/2009, 09/20/2011     FLUAD(HD)65+ QUAD 09/27/2020     Flu 65+ Years 09/20/2019     Flu, Unspecified 09/25/2017, 09/24/2018     Influenza (High Dose) 3 valent vaccine  09/29/2016, 09/17/2021     Influenza (IIV3) PF 10/19/2005, 10/27/2006, 10/23/2007, 10/28/2008, 10/19/2010, 09/20/2012     Influenza Vaccine IM > 6 months Valent IIV4 (Alfuria,Fluzone) 09/17/2013, 09/09/2014, 09/22/2015, 09/25/2017, 09/24/2018     Influenza Vaccine, 6+MO IM (QUADRIVALENT W/PRESERVATIVES) 09/17/2013     Influenza, Quad, High Dose, Pf, 65yr+ (Fluzone HD) 09/17/2021     Pneumo Conj 13-V (2010&after) 09/29/2016, 09/29/2016     Pneumococcal 23 valent 07/22/2014, 07/22/2014, 02/03/2015, 02/03/2015     Td (Adult), Adsorbed 05/19/2003     Tdap (Adacel,Boostrix) 05/29/2015, 05/29/2015     Zoster vaccine, live 11/10/2009       Medications list and allergies in the facility chart have been reviewed.  Please see facility EMR for most up to date list.       Review of Systems   Unable to obtain due to unresponsive    Physical Exam  Vital signs:BP (!) 157/77   Pulse 83   Temp 97.6  F (36.4  C)   Resp 18   Wt 83.9 kg (185 lb)   SpO2 93%   BMI 26.54 kg/m     GENERAL APPEARANCE: thin, elderly male, unresponsive and apneic breaths  HEENT: normocephalic, atraumatic  LUNGS:  Apneic breathing with cheyne jane  NEURO: unresponsive, Face is symmetric.  SKIN: Inspection of the skin reveals no rashes, ulcerations or petechiae.      Labs:  Patient is on hospice/palliative care and labs are not recommended      Assessment/plan:   Subdural hematoma without coma with loss of consciousness, sequela (H)  Late onset Alzheimer's dementia without behavioral disturbance (H)  End of life care  Patient actively dying.  All preventative medications discontinued.  Discussed with hospice nurse and will schedule ativan and morphine for comfort.        Electronically signed by: Netta Moore NP

## 2022-08-03 NOTE — LETTER
8/3/2022        RE: Nic Cleary  [address] [facility] [address]        Code Status:  DNR/DNI  Visit Type: Nursing Home Regulatory     Facility:   [facility] () [[id number]]        History of Present Illness:   Hospital Admission Date: 7/13/2022     Hospital Discharge Date: 7/18/2022      Nic Cleary is a 89 year old male with past medical history for dementia, HTN, DM2, HLD.  He was recently hospitalized after a fall at home and was found to have a displaced acute fracture of left pubic body and left inferior pubic ramus.  Palliative care was consulted and family ultimately decided on hospice comfort care.  He was signed on to [facility] and moved to LT.      Today, nursing requests a visit due to declining status.  Patient is on [facility] hospice and is unresponsive today.  He does have apneic breathing and gurgling tracheal sounds.            Current Outpatient Medications   Medication Sig Dispense Refill     acetaminophen (TYLENOL) 325 MG suppository Place 650 mg rectally every 6 hours as needed for fever       bisacodyl (DULCOLAX) 10 MG suppository Place 10 mg rectally daily as needed for constipation       hyoscyamine (LEVSIN) 0.125 MG tablet Take 0.125 mg by mouth every 4 hours as needed for cramping       magnesium hydroxide (MILK OF MAGNESIA) 400 MG/5ML suspension Take 30 mLs by mouth daily as needed for constipation       morphine 5 MG solu-tab Take 2.5 mg by mouth every hour as needed for shortness of breath / dyspnea or moderate to severe pain       prochlorperazine (COMPAZINE) 10 MG tablet Take 10 mg by mouth every 6 hours as needed for nausea or vomiting       No Known Allergies  Immunization History   Administered Date(s) Administered     COVID-19,PF,Pfizer (12+ Yrs) 01/26/2021, 02/19/2021, 10/15/2021     COVID-19,PF,Pfizer 12+ Yrs (2022 and After) 07/18/2022     FLU 6-35 months 09/24/2009, 09/20/2011     FLUAD(HD)65+ QUAD 09/27/2020     Flu 65+ Years 09/20/2019      Flu, Unspecified 09/25/2017, 09/24/2018     Influenza (High Dose) 3 valent vaccine 09/29/2016, 09/17/2021     Influenza (IIV3) PF 10/19/2005, 10/27/2006, 10/23/2007, 10/28/2008, 10/19/2010, 09/20/2012     Influenza Vaccine IM > 6 months Valent IIV4 (Alfuria,Fluzone) 09/17/2013, 09/09/2014, 09/22/2015, 09/25/2017, 09/24/2018     Influenza Vaccine, 6+MO IM (QUADRIVALENT W/PRESERVATIVES) 09/17/2013     Influenza, Quad, High Dose, Pf, 65yr+ (Fluzone HD) 09/17/2021     Pneumo Conj 13-V (2010&after) 09/29/2016, 09/29/2016     Pneumococcal 23 valent 07/22/2014, 07/22/2014, 02/03/2015, 02/03/2015     Td (Adult), Adsorbed 05/19/2003     Tdap (Adacel,Boostrix) 05/29/2015, 05/29/2015     Zoster vaccine, live 11/10/2009       Medications list and allergies in the facility chart have been reviewed.  Please see facility EMR for most up to date list.       Review of Systems   Unable to obtain due to unresponsive    Physical Exam  Vital signs:BP (!) 157/77   Pulse 83   Temp 97.6  F (36.4  C)   Resp 18   Wt 83.9 kg (185 lb)   SpO2 93%   BMI 26.54 kg/m     GENERAL APPEARANCE: thin, elderly male, unresponsive and apneic breaths  HEENT: normocephalic, atraumatic  LUNGS:  Apneic breathing with cheyne jane  NEURO: unresponsive, Face is symmetric.  SKIN: Inspection of the skin reveals no rashes, ulcerations or petechiae.      Labs:  Patient is on hospice/palliative care and labs are not recommended      Assessment/plan:   Subdural hematoma without coma with loss of consciousness, sequela (H)  Late onset Alzheimer's dementia without behavioral disturbance (H)  End of life care  Patient actively dying.  All preventative medications discontinued.  Discussed with hospice nurse and will schedule ativan and morphine for comfort.        Electronically signed by: Netta Moore NP           Sincerely,        Netta Moore NP